# Patient Record
Sex: FEMALE | Race: WHITE | Employment: FULL TIME | URBAN - NONMETROPOLITAN AREA
[De-identification: names, ages, dates, MRNs, and addresses within clinical notes are randomized per-mention and may not be internally consistent; named-entity substitution may affect disease eponyms.]

---

## 2017-03-08 DIAGNOSIS — Z30.41 SURVEILLANCE FOR BIRTH CONTROL, ORAL CONTRACEPTIVES: ICD-10-CM

## 2017-03-08 RX ORDER — LEVONORGESTREL AND ETHINYL ESTRADIOL 0.15-0.03
1 KIT ORAL DAILY
Qty: 112 TABLET | Refills: 1 | Status: SHIPPED | OUTPATIENT
Start: 2017-03-08 | End: 2017-08-25

## 2017-03-08 NOTE — TELEPHONE ENCOUNTER
Benjamin 0.15-30 mg - mcg       Last Written Prescription Date: 04/01/2016  Last Fill Quantity: 112,  # refills: 03   Last Office Visit with [facility], [facility] or [facility] prescribing provider: 10/07/2016        Rangel Sarmiento Technician  [facility]

## 2017-04-07 ENCOUNTER — OFFICE VISIT (OUTPATIENT)
Dept: FAMILY MEDICINE | Facility: CLINIC | Age: 32
End: 2017-04-07
Payer: COMMERCIAL

## 2017-04-07 VITALS
WEIGHT: 150 LBS | TEMPERATURE: 97.4 F | HEART RATE: 78 BPM | SYSTOLIC BLOOD PRESSURE: 120 MMHG | BODY MASS INDEX: 24.96 KG/M2 | OXYGEN SATURATION: 99 % | DIASTOLIC BLOOD PRESSURE: 82 MMHG

## 2017-04-07 DIAGNOSIS — B35.4 RINGWORM OF BODY: Primary | ICD-10-CM

## 2017-04-07 DIAGNOSIS — L85.8 CUTANEOUS HORN: ICD-10-CM

## 2017-04-07 DIAGNOSIS — L71.0 PERIORAL DERMATITIS: ICD-10-CM

## 2017-04-07 PROCEDURE — 99213 OFFICE O/P EST LOW 20 MIN: CPT | Mod: 25 | Performed by: FAMILY MEDICINE

## 2017-04-07 PROCEDURE — 17110 DESTRUCTION B9 LES UP TO 14: CPT | Performed by: FAMILY MEDICINE

## 2017-04-07 RX ORDER — PRENATAL VIT 91/IRON/FOLIC/DHA 28-975-200
COMBINATION PACKAGE (EA) ORAL 2 TIMES DAILY
Qty: 15 G | Refills: 1 | Status: SHIPPED | OUTPATIENT
Start: 2017-04-07 | End: 2017-10-20

## 2017-04-07 RX ORDER — DOXYCYCLINE 100 MG/1
100 CAPSULE ORAL 2 TIMES DAILY
Qty: 60 CAPSULE | Refills: 1 | Status: SHIPPED | OUTPATIENT
Start: 2017-04-07 | End: 2017-10-20

## 2017-04-07 RX ORDER — TRIAMCINOLONE ACETONIDE 1 MG/G
CREAM TOPICAL
Qty: 80 G | Refills: 0 | Status: SHIPPED | OUTPATIENT
Start: 2017-04-07 | End: 2017-10-20

## 2017-04-07 NOTE — NURSING NOTE
"Chief Complaint   Patient presents with     Derm Problem       Initial /82 (BP Location: Right arm, Patient Position: Chair, Cuff Size: Adult Regular)  Pulse 78  Temp 97.4  F (36.3  C) (Tympanic)  Wt 150 lb (68 kg)  SpO2 99%  BMI 24.96 kg/m2 Estimated body mass index is 24.96 kg/(m^2) as calculated from the following:    Height as of 10/7/16: 5' 5\" (1.651 m).    Weight as of this encounter: 150 lb (68 kg).  Medication Reconciliation: complete  "

## 2017-04-07 NOTE — PROGRESS NOTES
SUBJECTIVE:                                                    Britney Marquez is a 31 year old female who presents to clinic today for the following health issues:    Patient has a quarter size rash on left side of her neck, has been using clomitrozole cream and it is not getting better.     Also has a Mole on her chest she would like looked at.   Last few weeks the mole has grown and hurts.     Gets periodic pimples and papules around mouth, is just healing up now. Flares up regularly.      Problem list and histories reviewed & adjusted, as indicated.  Additional history: as documented    BP Readings from Last 3 Encounters:   04/07/17 120/82   10/07/16 122/76   09/26/16 130/88    Wt Readings from Last 3 Encounters:   04/07/17 68 kg (150 lb)   10/07/16 66.2 kg (146 lb)   09/26/16 66.7 kg (147 lb)        Reviewed and updated as needed this visit by clinical staff  Tobacco  Allergies  Med Hx  Surg Hx  Fam Hx  Soc Hx      Reviewed and updated as needed this visit by Provider       OBJECTIVE:                                                    /82 (BP Location: Right arm, Patient Position: Chair, Cuff Size: Adult Regular)  Pulse 78  Temp 97.4  F (36.3  C) (Tympanic)  Wt 68 kg (150 lb)  SpO2 99%  BMI 24.96 kg/m2  Body mass index is 24.96 kg/(m^2).  General: appears well, in no distress   Skin: 2 cm round erythematous patch with rolled borders and central clearing on left neck, 3x4 mm pedunculated mole on chest, few scattered papules on chin     ASSESSMENT/PLAN:                                                      ASSESSMENT:  1. Ringworm of body    2. Perioral dermatitis    3. Cutaneous horn        PLAN:  Orders Placed This Encounter     triamcinolone (KENALOG) 0.1 % cream     terbinafine (LAMISIL AT) 1 % cream     doxycycline (VIBRAMYCIN) 100 MG capsule     Cryotherapy x 2 to lesion on chest  As below  Patient Instructions   Do both the creams twice a day, stop the steroid cream after 6 days,  continue the lamisil if needed    Doxycycline twice a day, can move down to one a day if much better       Ivory Hicks MD  [facility] [place][note 2]

## 2017-04-07 NOTE — MR AVS SNAPSHOT
After Visit Summary   4/7/2017    Britney Marquez    MRN: [id number]           Patient Information     Date Of Birth          1985        Visit Information        Provider Department      4/7/2017 1:00 PM Ivory Hicks MD [facility]        Today's Diagnoses     Ringworm of body    -  1    Perioral dermatitis        Cutaneous horn          Care Instructions    Do both the creams twice a day, stop the steroid cream after 6 days, continue the lamisil if needed    Doxycycline twice a day, can move down to one a day if much better        Follow-ups after your visit        Who to contact     If you have questions or need follow up information about today's clinic visit or your schedule please contact [facility] [place] directly at [phone].  Normal or non-critical lab and imaging results will be communicated to you by MyChart, letter or phone within 4 business days after the clinic has received the results. If you do not hear from us within 7 days, please contact the clinic through MyChart or phone. If you have a critical or abnormal lab result, we will notify you by phone as soon as possible.  Submit refill requests through [employer] or call your pharmacy and they will forward the refill request to us. Please allow 3 business days for your refill to be completed.          Additional Information About Your Visit        MyChart Information     [employer] gives you secure access to your electronic health record. If you see a primary care provider, you can also send messages to your care team and make appointments. If you have questions, please call your primary care clinic.  If you do not have a primary care provider, please call [phone] and they will assist you.        Care EveryWhere ID     This is your Care EveryWhere ID. This could be used by other organizations to access your [place] medical records  [id number]        Your Vitals Were     Pulse Temperature  Pulse Oximetry BMI (Body Mass Index)          78 97.4  F (36.3  C) (Tympanic) 99% 24.96 kg/m2         Blood Pressure from Last 3 Encounters:   04/07/17 120/82   10/07/16 122/76   09/26/16 130/88    Weight from Last 3 Encounters:   04/07/17 150 lb (68 kg)   10/07/16 146 lb (66.2 kg)   09/26/16 147 lb (66.7 kg)              Today, you had the following     No orders found for display         Today's Medication Changes          These changes are accurate as of: 4/7/17  1:57 PM.  If you have any questions, ask your nurse or doctor.               Start taking these medicines.        Dose/Directions    doxycycline 100 MG capsule   Commonly known as:  VIBRAMYCIN   Used for:  Perioral dermatitis, Cutaneous horn   Started by:  Ivory Hicks MD        Dose:  100 mg   Take 1 capsule (100 mg) by mouth 2 times daily   Quantity:  60 capsule   Refills:  1       terbinafine 1 % cream   Commonly known as:  lamISIL AT   Used for:  Ringworm of body   Started by:  Ivory Hicks MD        Apply topically 2 times daily For fungal infection not resolved with other antifungals (e.g. Clotrimazole)   Quantity:  15 g   Refills:  1       triamcinolone 0.1 % cream   Commonly known as:  KENALOG   Used for:  Ringworm of body   Started by:  Ivory Hicks MD        Apply sparingly to affected area two times daily as needed   Quantity:  80 g   Refills:  0            Where to get your medicines      These medications were sent to [address] [address]     Phone:  [phone]     doxycycline 100 MG capsule    terbinafine 1 % cream    triamcinolone 0.1 % cream                Primary Care Provider Office Phone # Fax #    Ivory Hicks [phone]       [address] [facility] [address]        Thank you!     Thank you for choosing [facility]  for your care. Our goal is always to provide you with  excellent care. Hearing back from our patients is one way we can continue to improve our services. Please take a few minutes to complete the written survey that you may receive in the mail after your visit with us. Thank you!             Your Updated Medication List - Protect others around you: Learn how to safely use, store and throw away your medicines at www.disposemymeds.org.          This list is accurate as of: 4/7/17  1:57 PM.  Always use your most recent med list.                   Brand Name Dispense Instructions for use    azelastine 0.05 % Soln ophthalmic solution    OPTIVAR    1 Bottle    Apply 1 drop to eye 2 times daily       doxycycline 100 MG capsule    VIBRAMYCIN    60 capsule    Take 1 capsule (100 mg) by mouth 2 times daily       levocetirizine 5 MG tablet    XYZAL    30 tablet    Take 1 tablet (5 mg) by mouth every evening       levonorgestrel-ethinyl estradiol 0.15-30 MG-MCG per tablet    NORDETTE    112 tablet    Take 1 tablet by mouth daily Take active pills for 12 weeks then placebo x 1 week and restart.       terbinafine 1 % cream    lamISIL AT    15 g    Apply topically 2 times daily For fungal infection not resolved with other antifungals (e.g. Clotrimazole)       triamcinolone 0.1 % cream    KENALOG    80 g    Apply sparingly to affected area two times daily as needed

## 2017-04-07 NOTE — PATIENT INSTRUCTIONS
Do both the creams twice a day, stop the steroid cream after 6 days, continue the lamisil if needed    Doxycycline twice a day, can move down to one a day if much better

## 2017-06-02 ENCOUNTER — OFFICE VISIT (OUTPATIENT)
Dept: FAMILY MEDICINE | Facility: CLINIC | Age: 32
End: 2017-06-02
Payer: COMMERCIAL

## 2017-06-02 VITALS
HEIGHT: 65 IN | DIASTOLIC BLOOD PRESSURE: 80 MMHG | HEART RATE: 83 BPM | BODY MASS INDEX: 24.99 KG/M2 | OXYGEN SATURATION: 96 % | SYSTOLIC BLOOD PRESSURE: 129 MMHG | WEIGHT: 150 LBS

## 2017-06-02 DIAGNOSIS — Z30.8 ENCOUNTER FOR OTHER CONTRACEPTIVE MANAGEMENT: ICD-10-CM

## 2017-06-02 DIAGNOSIS — L65.9 ALOPECIA: ICD-10-CM

## 2017-06-02 DIAGNOSIS — M79.2 NEURALGIA INVOLVING SCALP: ICD-10-CM

## 2017-06-02 DIAGNOSIS — R51.9 SCALP PAIN: Primary | ICD-10-CM

## 2017-06-02 PROCEDURE — 99213 OFFICE O/P EST LOW 20 MIN: CPT | Performed by: FAMILY MEDICINE

## 2017-06-02 RX ORDER — GABAPENTIN 300 MG/1
CAPSULE ORAL
Qty: 20 CAPSULE | Refills: 1 | Status: SHIPPED | OUTPATIENT
Start: 2017-06-02 | End: 2017-10-20

## 2017-06-02 NOTE — MR AVS SNAPSHOT
After Visit Summary   6/2/2017    Britney Marquez    MRN: [id number]           Patient Information     Date Of Birth          1985        Visit Information        Provider Department      6/2/2017 1:20 PM Ivory Hicks MD [facility]        Today's Diagnoses     Scalp pain    -  1    Alopecia        Neuralgia involving scalp        Encounter for other contraceptive management          Care Instructions    Try taking Gabapentin at bedtime to see if this helps. If it does, it tells us that this is nerve pain. Start with just 1 pill, and you can increase up to 2 if you need.    Make an appointment with dermatology to see if they have any additional insight.             Follow-ups after your visit        Additional Services     DERMATOLOGY REFERRAL       Your provider has referred you to: [facility]: [facility] [phone]   [email or web address]    Please be aware that coverage of these services is subject to the terms and limitations of your health insurance plan.  Call member services at your health plan with any benefit or coverage questions.      Please bring the following with you to your appointment:    (1) Any X-Rays, CTs or MRIs which have been performed.  Contact the facility where they were done to arrange for  prior to your scheduled appointment.    (2) List of current medications  (3) This referral request   (4) Any documents/labs given to you for this referral            OB/GYN REFERRAL       Your provider has referred you to:  [facility]: [facility] [phone]   [email or web address]    Please be aware that coverage of these services is subject to the terms and limitations of your health insurance plan.  Call member services at your health plan with any benefit or coverage questions.      Please bring the following with you to your appointment:    (1) Any X-Rays, CTs or MRIs  "which have been performed.  Contact the facility where they were done to arrange for  prior to your scheduled appointment.   (2) List of current medications   (3) This referral request   (4) Any documents/labs given to you for this referral                  Who to contact     If you have questions or need follow up information about today's clinic visit or your schedule please contact [facility] directly at [phone].  Normal or non-critical lab and imaging results will be communicated to you by Open[employer]ance[employer], letter or phone within 4 business days after the clinic has received the results. If you do not hear from us within 7 days, please contact the clinic through [employer] or phone. If you have a critical or abnormal lab result, we will notify you by phone as soon as possible.  Submit refill requests through [employer] or call your pharmacy and they will forward the refill request to us. Please allow 3 business days for your refill to be completed.          Additional Information About Your Visit        Open[employer]ance[employer]Gig Information     [employer] gives you secure access to your electronic health record. If you see a primary care provider, you can also send messages to your care team and make appointments. If you have questions, please call your primary care clinic.  If you do not have a primary care provider, please call [phone] and they will assist you.        Care EveryWhere ID     This is your Care EveryWhere ID. This could be used by other organizations to access your [place] medical records  [id number]        Your Vitals Were     Pulse Height Pulse Oximetry BMI (Body Mass Index)          83 5' 5\" (1.651 m) 96% 24.96 kg/m2         Blood Pressure from Last 3 Encounters:   06/02/17 129/80   04/07/17 120/82   10/07/16 122/76    Weight from Last 3 Encounters:   06/02/17 150 lb (68 kg)   04/07/17 150 lb (68 kg)   10/07/16 146 lb (66.2 kg)              We Performed the Following     DERMATOLOGY REFERRAL  "    OB/GYN REFERRAL          Today's Medication Changes          These changes are accurate as of: 6/2/17  1:50 PM.  If you have any questions, ask your nurse or doctor.               Start taking these medicines.        Dose/Directions    gabapentin 300 MG capsule   Commonly known as:  NEURONTIN   Used for:  Scalp pain, Neuralgia involving scalp   Started by:  Ivory Hicks MD        Take 1 tablet (300 mg) at bedtime PRN   Quantity:  20 capsule   Refills:  1            Where to get your medicines      These medications were sent to [address] [address]     Phone:  [phone]     gabapentin 300 MG capsule                Primary Care Provider Office Phone # Fax #    Ivory Hicks [phone]       [address] [facility] [address]        Thank you!     Thank you for choosing [facility] [place]  for your care. Our goal is always to provide you with excellent care. Hearing back from our patients is one way we can continue to improve our services. Please take a few minutes to complete the written survey that you may receive in the mail after your visit with us. Thank you!             Your Updated Medication List - Protect others around you: Learn how to safely use, store and throw away your medicines at www.disposemymeds.org.          This list is accurate as of: 6/2/17  1:50 PM.  Always use your most recent med list.                   Brand Name Dispense Instructions for use    azelastine 0.05 % Soln ophthalmic solution    OPTIVAR    1 Bottle    Apply 1 drop to eye 2 times daily       doxycycline 100 MG capsule    VIBRAMYCIN    60 capsule    Take 1 capsule (100 mg) by mouth 2 times daily       gabapentin 300 MG capsule    NEURONTIN    20 capsule    Take 1 tablet (300 mg) at bedtime PRN       levocetirizine 5 MG tablet    XYZAL    30 tablet    Take 1 tablet (5 mg) by mouth every  evening       levonorgestrel-ethinyl estradiol 0.15-30 MG-MCG per tablet    KAY    112 tablet    Take 1 tablet by mouth daily Take active pills for 12 weeks then placebo x 1 week and restart.       terbinafine 1 % cream    lamISIL AT    15 g    Apply topically 2 times daily For fungal infection not resolved with other antifungals (e.g. Clotrimazole)       triamcinolone 0.1 % cream    KENALOG    80 g    Apply sparingly to affected area two times daily as needed

## 2017-06-02 NOTE — PROGRESS NOTES
"  SUBJECTIVE:                                                    Britney Marquez is a 31 year old female who presents to clinic today for the following health issues:    Rt scalp issues - pain and losing hair  TSH   Date Value Ref Range Status   07/16/2015 2.67 0.40 - 4.00 mU/L Final   01/24/2012 2.59 0.4 - 5.0 mU/L Final     She has an area on her scalp that is very painful. She gets headaches simply from the weight of her hair. She has to blow dry it to relive some of the pressure. She cannot put her hair in a pony because it hurts. She states that it feels like she \"is always burning.\"  It seems to be 1 specific area on the right, parietal area. She has noticed that her hair is falling out in this location as well.   She has tried different hair products to see if it was a skin reaction, but nothing seems to help.   Sometimes pain is so severe she can't stand it. Has been months    Birth control-  She would like a referral to OB/GYN for either Essure or tubal ligation.    The doxycycline really cleared up her skin, which she is very pleased about.    The cutaneous horn resolved with the cryotherapy and didn't scar.     The ringworm cleared up completely.    Problem list and histories reviewed & adjusted, as indicated.  Additional history: as documented    BP Readings from Last 3 Encounters:   06/02/17 129/80   04/07/17 120/82   10/07/16 122/76    Wt Readings from Last 3 Encounters:   06/02/17 68 kg (150 lb)   04/07/17 68 kg (150 lb)   10/07/16 66.2 kg (146 lb)         Reviewed and updated as needed this visit by clinical staff  Tobacco  Allergies  Meds  Problems  Med Hx  Surg Hx  Fam Hx  Soc Hx        Reviewed and updated as needed this visit by Provider  Allergies  Meds  Problems       ROS:  CONSTITUTIONAL:NEGATIVE for fever, chills, change in weight  INTEGUMENTARY/SKIN: POSITIVE for hair loss  GI: NEGATIVE for nausea, abdominal pain, heartburn, or change in bowel habits  NEURO: POSITIVE for " "burning sensation on scalp    OBJECTIVE:                                                    /80  Pulse 83  Ht 1.651 m (5' 5\")  Wt 68 kg (150 lb)  SpO2 96%  BMI 24.96 kg/m2  Body mass index is 24.96 kg/(m^2).  GENERAL: healthy, alert and no distress  SKIN:10 cm tender area on right posterior parietal area. No skin changes noted. Slight hair thinning in this area.        ASSESSMENT/PLAN:                                                    Britney was seen today for hair/scalp problem.    Diagnoses and all orders for this visit:    Scalp pain  -     DERMATOLOGY REFERRAL  -     gabapentin (NEURONTIN) 300 MG capsule; Take 1 tablet (300 mg) at bedtime PRN    Alopecia  -     DERMATOLOGY REFERRAL    Neuralgia involving scalp  -     gabapentin (NEURONTIN) 300 MG capsule; Take 1 tablet (300 mg) at bedtime PRN    Encounter for other contraceptive management  -     OB/GYN REFERRAL        Patient Instructions   Try taking Gabapentin at bedtime to see if this helps. If it does, it tells us that this is nerve pain. Start with just 1 pill, and you can increase up to 2 if you need.    Make an appointment with dermatology to see if they have any additional insight.       This document serves as a record of the services and decisions personally performed and made by Ivory Hicks MD. It was created on her behalf by Tamica Jolly, a trained medical scribe. The creation of this document is based the provider's statements to the medical scribe.  Tamica Jolly 2:04 PM 6/2/2017    Provider:   The information in this document, created by the medical scribe for me, accurately reflects the services I personally performed and the decisions made by me. I have reviewed and approved this document for accuracy prior to leaving the patient care area.  Ivory Hicks MD 2:04 PM 6/2/2017      Ivory Hicks MD  [facility] [place] [facility] [place]  "

## 2017-06-02 NOTE — NURSING NOTE
"Chief Complaint   Patient presents with     Hair/Scalp Problem     rt scalp poin and loosing hair       Initial /80  Pulse 83  Ht 5' 5\" (1.651 m)  Wt 150 lb (68 kg)  SpO2 96%  BMI 24.96 kg/m2 Estimated body mass index is 24.96 kg/(m^2) as calculated from the following:    Height as of this encounter: 5' 5\" (1.651 m).    Weight as of this encounter: 150 lb (68 kg).  Medication Reconciliation: complete      "

## 2017-06-02 NOTE — PATIENT INSTRUCTIONS
Try taking Gabapentin at bedtime to see if this helps. If it does, it tells us that this is nerve pain. Start with just 1 pill, and you can increase up to 2 if you need.    Make an appointment with dermatology to see if they have any additional insight.

## 2017-08-25 ENCOUNTER — TELEPHONE (OUTPATIENT)
Dept: FAMILY MEDICINE | Facility: CLINIC | Age: 32
End: 2017-08-25

## 2017-08-25 DIAGNOSIS — J30.9 ALLERGIC RHINITIS: ICD-10-CM

## 2017-08-25 DIAGNOSIS — Z30.41 SURVEILLANCE FOR BIRTH CONTROL, ORAL CONTRACEPTIVES: ICD-10-CM

## 2017-08-25 RX ORDER — LEVOCETIRIZINE DIHYDROCHLORIDE 5 MG/1
TABLET, FILM COATED ORAL
Qty: 30 TABLET | Refills: 1 | Status: SHIPPED | OUTPATIENT
Start: 2017-08-25 | End: 2018-07-20

## 2017-08-25 RX ORDER — LEVONORGESTREL AND ETHINYL ESTRADIOL 0.15-0.03
KIT ORAL
Qty: 112 TABLET | Refills: 1 | Status: SHIPPED | OUTPATIENT
Start: 2017-08-25 | End: 2018-02-12

## 2017-10-19 ENCOUNTER — TELEPHONE (OUTPATIENT)
Dept: FAMILY MEDICINE | Facility: CLINIC | Age: 32
End: 2017-10-19

## 2017-10-19 NOTE — TELEPHONE ENCOUNTER
Patient called and she is reporting a low grade fever not greater than 99 and reports that daughter is now sick and has fever on 100. Patient reports bilateral ear pain and congestion with continual runny nose with light colored secretions. Patient denies bladder pain, no breathing difficulties. Patient feels congested and has sinus pain and headache. Reports did not have the flu shot and states is having muscle aches and pain. Currently is taking OTC mucinex with tylenol added in ingredients. Reports is drinking well. Advised to be seen, have scheduled an appointment for tomorrow morning at 11. Advised to use OTC decongestant, encourage extra fluids,  and may use tylenol or ibuprofen for the pain. Advised if fever worsens and is not remedied with pain relievers to seek the ER sooner. Patient agrees with the plan. Have also set patient's daughter up to be seen tomorrow as well.  FERNANDA Watts

## 2017-10-19 NOTE — TELEPHONE ENCOUNTER
Reason for call:  Patient reporting a symptom    Symptom or request: low grade fever last two weeks    Duration (how long have symptoms been present): 2 weeks    Have you been treated for this before? No        Phone Number patient can be reached at:  Home number on file [phone] (home)    Best Time:  anytime    Can we leave a detailed message on this number:  Not Applicable    Call taken on 10/19/2017 at 1:15 PM by Rhianna Beaver

## 2017-10-20 ENCOUNTER — OFFICE VISIT (OUTPATIENT)
Dept: FAMILY MEDICINE | Facility: CLINIC | Age: 32
End: 2017-10-20
Payer: COMMERCIAL

## 2017-10-20 VITALS
OXYGEN SATURATION: 98 % | HEART RATE: 72 BPM | DIASTOLIC BLOOD PRESSURE: 80 MMHG | SYSTOLIC BLOOD PRESSURE: 128 MMHG | TEMPERATURE: 97.2 F | BODY MASS INDEX: 24.96 KG/M2 | WEIGHT: 150 LBS

## 2017-10-20 DIAGNOSIS — R05.9 COUGH: ICD-10-CM

## 2017-10-20 DIAGNOSIS — J01.90 ACUTE SINUSITIS WITH SYMPTOMS > 10 DAYS: Primary | ICD-10-CM

## 2017-10-20 PROCEDURE — 99213 OFFICE O/P EST LOW 20 MIN: CPT | Performed by: FAMILY MEDICINE

## 2017-10-20 NOTE — MR AVS SNAPSHOT
After Visit Summary   10/20/2017    Britney Marquez    MRN: [id number]           Patient Information     Date Of Birth          1985        Visit Information        Provider Department      10/20/2017 11:00 AM Ivory Hicks MD [facility]        Today's Diagnoses     Acute sinusitis with symptoms > 10 days    -  1    Cough          Care Instructions    Antibiotic   Rest  Fluids  Quit smoking  Return to clinic if symptoms persist or worsen.           Follow-ups after your visit        Who to contact     If you have questions or need follow up information about today's clinic visit or your schedule please contact [facility] directly at [phone].  Normal or non-critical lab and imaging results will be communicated to you by MyChart, letter or phone within 4 business days after the clinic has received the results. If you do not hear from us within 7 days, please contact the clinic through [employer] or phone. If you have a critical or abnormal lab result, we will notify you by phone as soon as possible.  Submit refill requests through [facility] or call your pharmacy and they will forward the refill request to us. Please allow 3 business days for your refill to be completed.          Additional Information About Your Visit        MyChart Information     [facility] gives you secure access to your electronic health record. If you see a primary care provider, you can also send messages to your care team and make appointments. If you have questions, please call your primary care clinic.  If you do not have a primary care provider, please call [phone] and they will assist you.        Care EveryWhere ID     This is your Care EveryWhere ID. This could be used by other organizations to access your [place] medical records  [id number]        Your Vitals Were     Pulse Temperature Pulse Oximetry BMI (Body Mass Index)          72 97.2  F (36.2  C) (Tympanic) 98% 24.96  kg/m2         Blood Pressure from Last 3 Encounters:   10/20/17 128/80   06/02/17 129/80   04/07/17 120/82    Weight from Last 3 Encounters:   10/20/17 150 lb (68 kg)   06/02/17 150 lb (68 kg)   04/07/17 150 lb (68 kg)              Today, you had the following     No orders found for display         Today's Medication Changes          These changes are accurate as of: 10/20/17 11:06 AM.  If you have any questions, ask your nurse or doctor.               Start taking these medicines.        Dose/Directions    amoxicillin-clavulanate 875-125 MG per tablet   Commonly known as:  AUGMENTIN   Used for:  Acute sinusitis with symptoms > 10 days, Cough   Started by:  Ivory Hicks MD        Dose:  1 tablet   Take 1 tablet by mouth 2 times daily   Quantity:  20 tablet   Refills:  0         Stop taking these medicines if you haven't already. Please contact your care team if you have questions.     doxycycline 100 MG capsule   Commonly known as:  VIBRAMYCIN   Stopped by:  Ivory Hicks MD           gabapentin 300 MG capsule   Commonly known as:  NEURONTIN   Stopped by:  Ivory Hicks MD           terbinafine 1 % cream   Commonly known as:  lamISIL AT   Stopped by:  Ivory Hicks MD           triamcinolone 0.1 % cream   Commonly known as:  KENALOG   Stopped by:  Ivory Hicks MD                Where to get your medicines      These medications were sent to [facility] [address] [address]     Phone:  [phone]     amoxicillin-clavulanate 875-125 MG per tablet                Primary Care Provider Office Phone # Fax #    Ivory Hicks [phone]       [address] [place] [address]        Equal Access to Services     RHODA GUTIÉRREZ : Amisha Riley, jolie barrett, jesse james, robin traylor. So [facility] [phone].    ATENCIÓN: tiffanie Montelongo  castro disposición servicios gratuitos de asistencia lingüística. Cornelius joseph [phone].    We comply with applicable federal civil rights laws and [place] laws. We do not discriminate on the basis of race, color, national origin, age, disability, sex, sexual orientation, or gender identity.            Thank you!     Thank you for choosing [facility]  for your care. Our goal is always to provide you with excellent care. Hearing back from our patients is one way we can continue to improve our services. Please take a few minutes to complete the written survey that you may receive in the mail after your visit with us. Thank you!             Your Updated Medication List - Protect others around you: Learn how to safely use, store and throw away your medicines at www.disposemymeds.org.          This list is accurate as of: 10/20/17 11:06 AM.  Always use your most recent med list.                   Brand Name Dispense Instructions for use Diagnosis    amoxicillin-clavulanate 875-125 MG per tablet    AUGMENTIN    20 tablet    Take 1 tablet by mouth 2 times daily    Acute sinusitis with symptoms > 10 days, Cough       azelastine 0.05 % Soln ophthalmic solution    OPTIVAR    1 Bottle    Apply 1 drop to eye 2 times daily    Chronic allergic conjunctivitis       levocetirizine 5 MG tablet    XYZAL    30 tablet    TAKE ONE TABLET BY MOUTH EVERY EVENING    Allergic rhinitis       BEATRIZ-28 0.15-30 MG-MCG per tablet   Generic drug:  levonorgestrel-ethinyl estradiol     112 tablet    TAKE 1 TABLET BY MOUTH EVERY DAY. TAKE  ACTIVE PILLS BY MOUTH  FOR 12 WEEKS (3 MONTHS), THEN PLACEBO TABLET FOR 1 WEEK, THEN RESTART    Surveillance for birth control, oral contraceptives

## 2017-10-20 NOTE — PROGRESS NOTES
SUBJECTIVE:   Britney Marquez is a 32 year old female who presents to clinic today for the following health issues:      RESPIRATORY SYMPTOMS      Duration: 2 weeks    Description  nasal congestion, rhinorrhea, facial pain/pressure, cough, fever, chills, ear pain both, headache, fatigue/malaise and hoarse voice    Severity: severe    Accompanying signs and symptoms: None    History (predisposing factors):  none    Precipitating or alleviating factors: None    Therapies tried and outcome:  rest and mucinex    Started with fever, cough, runny nose, pain and pressure in sinuses, ears. Not getting better.   Still smoking 5 cig/day. Malaise.     Problem list and histories reviewed & adjusted, as indicated.  Additional history: as documented    BP Readings from Last 3 Encounters:   10/20/17 128/80   06/02/17 129/80   04/07/17 120/82    Wt Readings from Last 3 Encounters:   10/20/17 150 lb (68 kg)   06/02/17 150 lb (68 kg)   04/07/17 150 lb (68 kg)           Reviewed and updated as needed this visit by clinical staffTobacco  Allergies  Med Hx  Surg Hx  Fam Hx  Soc Hx      Reviewed and updated as needed this visit by Provider         OBJECTIVE: /80 (BP Location: Left arm)  Pulse 72  Temp 97.2  F (36.2  C) (Tympanic)  Wt 150 lb (68 kg)  SpO2 98%  BMI 24.96 kg/m2   General: appears well, no distress  HEENT: TMs both with fluid, no erythema, and canals negative bilaterally, oropharynx with erythema, no exudate  Neck: supple, small anterior cervical  adenopathy  Heart: regular rate and rhythm, normal S1S2, no murmur  Lungs: clear to ascultation     ASSESSMENT:  1. Acute sinusitis with symptoms > 10 days    2. Cough        PLAN:  Orders Placed This Encounter     amoxicillin-clavulanate (AUGMENTIN) 875-125 MG per tablet       Patient Instructions   Antibiotic   Rest  Fluids  Quit smoking  Return to clinic if symptoms persist or worsen.      Ivory Hayward MD

## 2017-10-20 NOTE — NURSING NOTE
"Chief Complaint   Patient presents with     Sinus Problem     x 2 month       Initial /80 (BP Location: Left arm)  Pulse 72  Temp 97.2  F (36.2  C) (Tympanic)  Wt 150 lb (68 kg)  SpO2 98%  BMI 24.96 kg/m2 Estimated body mass index is 24.96 kg/(m^2) as calculated from the following:    Height as of 6/2/17: 5' 5\" (1.651 m).    Weight as of this encounter: 150 lb (68 kg).  Medication Reconciliation: complete    Health Maintenance that is potentially due pending provider review:  NONE    n/a    Is there anyone who you would like to be able to receive your results? No  If yes have patient fill out PENNY    "

## 2017-10-25 ENCOUNTER — TELEPHONE (OUTPATIENT)
Dept: FAMILY MEDICINE | Facility: CLINIC | Age: 32
End: 2017-10-25

## 2017-10-25 NOTE — TELEPHONE ENCOUNTER
Spoke with Britney, some one at her work told her to call to see if she could get a steroid for her sinus infection because she is not feeling any better on the Augmentin, she denies fever, wheezing or any increased work of breathing. Advised she should continue the Augmentin, if not feeling better after full treatment or if she gets worse, should call or come in.

## 2017-10-25 NOTE — TELEPHONE ENCOUNTER
Reason for call:  Patient reporting a symptom    Symptom or request: Britney says she was seen last Friday by Dr Hicks for sinus infection and was put on Augmentin. She has taken the antibiotic for 5 days not and is still very congested and has a lot of sinus pressure. She is asking if she can talk to the nurse.    Phone Number patient can be reached at:  Home number on file [phone] (home)    Best Time:  anytime    Can we leave a detailed message on this number:  YES    Call taken on 10/25/2017 at 2:55 PM by Sandra Mike

## 2017-10-31 ENCOUNTER — TELEPHONE (OUTPATIENT)
Dept: FAMILY MEDICINE | Facility: CLINIC | Age: 32
End: 2017-10-31

## 2017-10-31 DIAGNOSIS — J01.90 ACUTE SINUSITIS WITH SYMPTOMS GREATER THAN 10 DAYS: Primary | ICD-10-CM

## 2017-10-31 RX ORDER — PREDNISONE 20 MG/1
20 TABLET ORAL 2 TIMES DAILY
Qty: 10 TABLET | Refills: 0 | Status: SHIPPED | OUTPATIENT
Start: 2017-10-31 | End: 2017-12-05

## 2017-10-31 NOTE — TELEPHONE ENCOUNTER
Spoke with pt who continues with sinus congestion and ear pain.  Afebrile.  Denies other symptoms cough, SOB, nasal drainage.      Pt states that on 10/25/17 call she was told to stop taking Augmentin if not helping her.  Note states:  Sejal Esparza RN     10/25/17 3:11 PM     Spoke with Britney, some one at her work told her to call to see if she could get a steroid for her sinus infection because she is not feeling any better on the Augmentin, she denies fever, wheezing or any increased work of breathing. Advised she should continue the Augmentin, if not feeling better after full treatment or if she gets worse, should call or come in.         Pt has been off of Augmentin since 10/25 after completing 5 of 10 prescribed days.  Please advise on how you would like pt to proceed.  Finish previous course of abx or provide new prescription?    Jillian TORO RN

## 2017-10-31 NOTE — TELEPHONE ENCOUNTER
Reason for call:  Patient reporting a symptom    Symptom or request: Ear pain, sore throat and sinus continue to be a issue. Pleas see 10/20/17 OV & Tele Visit    Duration (how long have symptoms been present): 4th week    Have you been treated for this before? Yes    Phone Number patient can be reached at:  Home number on file [phone] (home)    Best Time:  Any Time      Can we leave a detailed message on this number:  YES    Call taken on 10/31/2017 at 2:44 PM by Carmen Arnett

## 2017-12-05 ENCOUNTER — HOSPITAL ENCOUNTER (OUTPATIENT)
Dept: CT IMAGING | Facility: CLINIC | Age: 32
Discharge: HOME | End: 2017-12-05
Attending: FAMILY MEDICINE | Admitting: FAMILY MEDICINE
Payer: COMMERCIAL

## 2017-12-05 ENCOUNTER — OFFICE VISIT (OUTPATIENT)
Dept: FAMILY MEDICINE | Facility: CLINIC | Age: 32
End: 2017-12-05
Payer: COMMERCIAL

## 2017-12-05 VITALS
OXYGEN SATURATION: 98 % | HEART RATE: 96 BPM | WEIGHT: 152 LBS | BODY MASS INDEX: 25.29 KG/M2 | TEMPERATURE: 97.9 F | DIASTOLIC BLOOD PRESSURE: 84 MMHG | SYSTOLIC BLOOD PRESSURE: 124 MMHG

## 2017-12-05 DIAGNOSIS — L71.0 PERIORAL DERMATITIS: ICD-10-CM

## 2017-12-05 DIAGNOSIS — R10.84 ABDOMINAL PAIN, GENERALIZED: ICD-10-CM

## 2017-12-05 DIAGNOSIS — R10.84 ABDOMINAL PAIN, GENERALIZED: Primary | ICD-10-CM

## 2017-12-05 LAB
ALBUMIN SERPL-MCNC: 4 G/DL (ref 3.4–5)
ALBUMIN UR-MCNC: NEGATIVE MG/DL
ALP SERPL-CCNC: 64 U/L (ref 40–150)
ALT SERPL W P-5'-P-CCNC: 36 U/L (ref 0–50)
ANION GAP SERPL CALCULATED.3IONS-SCNC: 9 MMOL/L (ref 3–14)
APPEARANCE UR: CLEAR
AST SERPL W P-5'-P-CCNC: 23 U/L (ref 0–45)
BASOPHILS # BLD AUTO: 0.1 10E9/L (ref 0–0.2)
BASOPHILS NFR BLD AUTO: 0.6 %
BILIRUB SERPL-MCNC: 0.4 MG/DL (ref 0.2–1.3)
BILIRUB UR QL STRIP: NEGATIVE
BUN SERPL-MCNC: 8 MG/DL (ref 7–30)
CALCIUM SERPL-MCNC: 8.8 MG/DL (ref 8.5–10.1)
CHLORIDE SERPL-SCNC: 104 MMOL/L (ref 94–109)
CO2 SERPL-SCNC: 25 MMOL/L (ref 20–32)
COLOR UR AUTO: YELLOW
CREAT SERPL-MCNC: 0.63 MG/DL (ref 0.52–1.04)
CRP SERPL-MCNC: 6.7 MG/L (ref 0–8)
DIFFERENTIAL METHOD BLD: NORMAL
EOSINOPHIL # BLD AUTO: 0.4 10E9/L (ref 0–0.7)
EOSINOPHIL NFR BLD AUTO: 4.7 %
ERYTHROCYTE [DISTWIDTH] IN BLOOD BY AUTOMATED COUNT: 12.7 % (ref 10–15)
ERYTHROCYTE [SEDIMENTATION RATE] IN BLOOD BY WESTERGREN METHOD: 9 MM/H (ref 0–20)
GFR SERPL CREATININE-BSD FRML MDRD: >90 ML/MIN/1.7M2
GLUCOSE SERPL-MCNC: 96 MG/DL (ref 70–99)
GLUCOSE UR STRIP-MCNC: NEGATIVE MG/DL
HCT VFR BLD AUTO: 38.8 % (ref 35–47)
HGB BLD-MCNC: 12.5 G/DL (ref 11.7–15.7)
HGB UR QL STRIP: ABNORMAL
KETONES UR STRIP-MCNC: NEGATIVE MG/DL
LEUKOCYTE ESTERASE UR QL STRIP: NEGATIVE
LYMPHOCYTES # BLD AUTO: 2.3 10E9/L (ref 0.8–5.3)
LYMPHOCYTES NFR BLD AUTO: 27.3 %
MCH RBC QN AUTO: 30.9 PG (ref 26.5–33)
MCHC RBC AUTO-ENTMCNC: 32.2 G/DL (ref 31.5–36.5)
MCV RBC AUTO: 96 FL (ref 78–100)
MONOCYTES # BLD AUTO: 0.9 10E9/L (ref 0–1.3)
MONOCYTES NFR BLD AUTO: 10.6 %
MUCOUS THREADS #/AREA URNS LPF: PRESENT /LPF
NEUTROPHILS # BLD AUTO: 4.8 10E9/L (ref 1.6–8.3)
NEUTROPHILS NFR BLD AUTO: 56.8 %
NITRATE UR QL: NEGATIVE
NON-SQ EPI CELLS #/AREA URNS LPF: ABNORMAL /LPF
PH UR STRIP: 6.5 PH (ref 5–7)
PLATELET # BLD AUTO: 329 10E9/L (ref 150–450)
POTASSIUM SERPL-SCNC: 4.2 MMOL/L (ref 3.4–5.3)
PROT SERPL-MCNC: 7.7 G/DL (ref 6.8–8.8)
RBC # BLD AUTO: 4.04 10E12/L (ref 3.8–5.2)
RBC #/AREA URNS AUTO: ABNORMAL /HPF
SODIUM SERPL-SCNC: 138 MMOL/L (ref 133–144)
SOURCE: ABNORMAL
SP GR UR STRIP: 1.02 (ref 1–1.03)
UROBILINOGEN UR STRIP-ACNC: 0.2 EU/DL (ref 0.2–1)
WBC # BLD AUTO: 8.5 10E9/L (ref 4–11)
WBC #/AREA URNS AUTO: ABNORMAL /HPF

## 2017-12-05 PROCEDURE — 85025 COMPLETE CBC W/AUTO DIFF WBC: CPT | Performed by: FAMILY MEDICINE

## 2017-12-05 PROCEDURE — 85652 RBC SED RATE AUTOMATED: CPT | Performed by: FAMILY MEDICINE

## 2017-12-05 PROCEDURE — 25000125 ZZHC RX 250: Performed by: RADIOLOGY

## 2017-12-05 PROCEDURE — 25000128 H RX IP 250 OP 636: Performed by: RADIOLOGY

## 2017-12-05 PROCEDURE — 86140 C-REACTIVE PROTEIN: CPT | Performed by: FAMILY MEDICINE

## 2017-12-05 PROCEDURE — 74176 CT ABD & PELVIS W/O CONTRAST: CPT

## 2017-12-05 PROCEDURE — 36415 COLL VENOUS BLD VENIPUNCTURE: CPT | Performed by: FAMILY MEDICINE

## 2017-12-05 PROCEDURE — 81001 URINALYSIS AUTO W/SCOPE: CPT | Performed by: FAMILY MEDICINE

## 2017-12-05 PROCEDURE — 80053 COMPREHEN METABOLIC PANEL: CPT | Performed by: FAMILY MEDICINE

## 2017-12-05 PROCEDURE — 99214 OFFICE O/P EST MOD 30 MIN: CPT | Performed by: FAMILY MEDICINE

## 2017-12-05 RX ORDER — DOXYCYCLINE 100 MG/1
100 CAPSULE ORAL 2 TIMES DAILY
Qty: 60 CAPSULE | Refills: 1 | COMMUNITY
Start: 2017-12-05 | End: 2020-03-24

## 2017-12-05 RX ORDER — IOPAMIDOL 755 MG/ML
74 INJECTION, SOLUTION INTRAVASCULAR ONCE
Status: DISCONTINUED | OUTPATIENT
Start: 2017-12-05 | End: 2017-12-06 | Stop reason: HOSPADM

## 2017-12-05 NOTE — PROGRESS NOTES
"  SUBJECTIVE:   Britney Marquez is a 32 year old female who presents to clinic today for the following health issues:      Abdominal Pain      Duration: 2 weeks    Description (location/character/radiation): abdominal pain and fullness       Associated flank pain: None    Intensity:  severe    Accompanying signs and symptoms:        Fever/Chills: no        Gas/Bloating: YES       Nausea/vomitting: no        Diarrhea: YES       Dysuria or Hematuria: no     History (previous similar pain/trauma/previous testing): no    Precipitating or alleviating factors:       Pain worse with eating/BM/urination: yes       Pain relieved by BM: no     Therapies tried and outcome: None    LMP:  not applicable    2.5-3 weeks has felt like her belly is distended, feels a pressure downward, like \"something is coming out of me\". Like when she was in labor. Is very painful.   Has diarrhea, once a day,  has been taking imodium or over the counter med that she doesn't know the name. Is afraid to be at work even.   Is so much pressure. Always feels the need to go to the bathroom, both ends.   No dysuria, urinary frequency or urgency.   She had some vaginal bleeding, last few days. But is random, is on continuous OCPS with a period every 3 months.   Nausea in the car only. No vomiting.   No fever or chills. Does sweat a lot. The whole thing is very upsetting.   When stands up she feels much worse. Is quite distressing.   No change in sexual partners.       Problem list and histories reviewed & adjusted, as indicated.  Additional history: as documented    BP Readings from Last 3 Encounters:   12/05/17 124/84   10/20/17 128/80   06/02/17 129/80    Wt Readings from Last 3 Encounters:   12/05/17 152 lb (68.9 kg)   10/20/17 150 lb (68 kg)   06/02/17 150 lb (68 kg)             Reviewed and updated as needed this visit by clinical staffTobacco  Allergies  Med Hx  Surg Hx  Fam Hx  Soc Hx      Reviewed and updated as needed this visit by " Provider       ROS: 5 point ROS negative except as noted above in HPI, including Gen., Resp., CV, GI &  system review. Still smoking.     OBJECTIVE: /84 (BP Location: Left arm)  Pulse 96  Temp 97.9  F (36.6  C) (Tympanic)  Wt 152 lb (68.9 kg)  SpO2 98%  BMI 25.29 kg/m2   General: appears in some distress, tears up, normal gait  Neck: supple, no adenopathy  Heart: regular rate and rhythm, normal S1S2, no murmur  Lungs: clear to ascultation   Abd: soft, tender throughout, especially in midabdomen, with guarding, no HSM, no mass or distention, normal bowel sounds, no peritoneal sounds    Results for orders placed or performed in visit on 12/05/17   *UA reflex to Microscopic and Culture ([place] [facility] (except [place] and [place])   Result Value Ref Range    Color Urine Yellow     Appearance Urine Clear     Glucose Urine Negative NEG^Negative mg/dL    Bilirubin Urine Negative NEG^Negative    Ketones Urine Negative NEG^Negative mg/dL    Specific Gravity Urine 1.020 1.003 - 1.035    Blood Urine Trace (A) NEG^Negative    pH Urine 6.5 5.0 - 7.0 pH    Protein Albumin Urine Negative NEG^Negative mg/dL    Urobilinogen Urine 0.2 0.2 - 1.0 EU/dL    Nitrite Urine Negative NEG^Negative    Leukocyte Esterase Urine Negative NEG^Negative    Source Midstream Urine    CBC with platelets and differential   Result Value Ref Range    WBC 8.5 4.0 - 11.0 10e9/L    RBC Count 4.04 3.8 - 5.2 10e12/L    Hemoglobin 12.5 11.7 - 15.7 g/dL    Hematocrit 38.8 35.0 - 47.0 %    MCV 96 78 - 100 fl    MCH 30.9 26.5 - 33.0 pg    MCHC 32.2 31.5 - 36.5 g/dL    RDW 12.7 10.0 - 15.0 %    Platelet Count 329 150 - 450 10e9/L    Diff Method Automated Method     % Neutrophils 56.8 %    % Lymphocytes 27.3 %    % Monocytes 10.6 %    % Eosinophils 4.7 %    % Basophils 0.6 %    Absolute Neutrophil 4.8 1.6 - 8.3 10e9/L    Absolute Lymphocytes 2.3 0.8 - 5.3 10e9/L    Absolute Monocytes 0.9 0.0 - 1.3 10e9/L    Absolute Eosinophils 0.4 0.0 -  0.7 10e9/L    Absolute Basophils 0.1 0.0 - 0.2 10e9/L   ESR: Erythrocyte sedimentation rate   Result Value Ref Range    Sed Rate 9 0 - 20 mm/h   Urine Microscopic   Result Value Ref Range    WBC Urine O - 2 OTO2^O - 2 /HPF    RBC Urine 2-5 (A) OTO2^O - 2 /HPF    Squamous Epithelial /LPF Urine Few FEW^Few /LPF    Mucous Urine Present (A) NEG^Negative /LPF      ASSESSMENT:  1. Abdominal pain, generalized    2. Perioral dermatitis        PLAN:  Orders Placed This Encounter     CT Abdomen Pelvis w Contrast     *UA reflex to Microscopic and Culture ([place] [facility] (except [place] and [place])     CBC with platelets and differential     Comprehensive metabolic panel     CRP, inflammation     ESR: Erythrocyte sedimentation rate     Urine Microscopic     doxycycline (VIBRAMYCIN) 100 MG capsule     She is quite obviously distressed   CT abdomen, labs  Consider surgery consult      Patient Instructions   I recommend CT abdomen as the next step  5:30 pm today         Ivory Hayward MD

## 2017-12-05 NOTE — MR AVS SNAPSHOT
After Visit Summary   12/5/2017    Britney Marquez    MRN: [id number]           Patient Information     Date Of Birth          1985        Visit Information        Provider Department      12/5/2017 10:20 AM Ivory Hicks MD [facility]        Today's Diagnoses     Abdominal pain, generalized    -  1    Perioral dermatitis          Care Instructions    I recommend CT abdomen as the next step  5:30 pm today              Follow-ups after your visit        Your next 10 appointments already scheduled     Dec 05, 2017  5:45 PM CST   (Arrive by 5:30 PM)   CT ABDOMEN PELVIS W CONTRAST with WYCT1   [facility] CT ([facility])    [address]  [facility] [address]   [phone]           Please bring any scans or X-rays taken at other hospitals, if similar tests were done. Also bring a list of your medicines, including vitamins, minerals and over-the-counter drugs. It is safest to leave personal items at home.  Be sure to tell your doctor:   If you have any allergies.   If there s any chance you are pregnant.   If you are breastfeeding.   If you have any special needs.  You may have contrast for this exam. To prepare:   Do not eat or drink for 2 hours before your exam. If you need to take medicine, you may take it with small sips of water. (We may ask you to take liquid medicine as well.)   The day before your exam, drink extra fluids at least six 8-ounce glasses (unless your doctor tells you to restrict your fluids).  Patients over 70 or patients with diabetes or kidney problems:   If you haven t had a blood test (creatinine test) within the last 30 days, go to your clinic or Diagnostic Imaging Department for this test.  If you have diabetes:   If your kidney function is normal, continue taking your metformin (Avandamet, Glucophage, Glucovance, Metaglip) on the day of your exam.   If your kidney function is abnormal, wait 48 hours before restarting  this medicine.  You will have oral contrast for this exam:   You will drink the contrast at home. Get this from your clinic or Diagnostic Imaging Department. Please follow the directions given.  Please wear loose clothing, such as a sweat suit or jogging clothes. Avoid snaps, zippers and other metal. We may ask you to undress and put on a hospital gown.  If you have any questions, please call the Imaging Department where you will have your exam.              Future tests that were ordered for you today     Open Future Orders        Priority Expected Expires Ordered    CT Abdomen Pelvis w Contrast STAT  12/5/2018 12/5/2017            Who to contact     If you have questions or need follow up information about today's clinic visit or your schedule please contact [facility] directly at [phone].  Normal or non-critical lab and imaging results will be communicated to you by MyChart, letter or phone within 4 business days after the clinic has received the results. If you do not hear from us within 7 days, please contact the clinic through [employer] or phone. If you have a critical or abnormal lab result, we will notify you by phone as soon as possible.  Submit refill requests through [employer] or call your pharmacy and they will forward the refill request to us. Please allow 3 business days for your refill to be completed.          Additional Information About Your Visit        [employer] Information     [employer] gives you secure access to your electronic health record. If you see a primary care provider, you can also send messages to your care team and make appointments. If you have questions, please call your primary care clinic.  If you do not have a primary care provider, please call [phone] and they will assist you.        Care EveryWhere ID     This is your Care EveryWhere ID. This could be used by other organizations to access your [place] medical records  [id number]        Your Vitals Were      Pulse Temperature Pulse Oximetry BMI (Body Mass Index)          96 97.9  F (36.6  C) (Tympanic) 98% 25.29 kg/m2         Blood Pressure from Last 3 Encounters:   12/05/17 124/84   10/20/17 128/80   06/02/17 129/80    Weight from Last 3 Encounters:   12/05/17 152 lb (68.9 kg)   10/20/17 150 lb (68 kg)   06/02/17 150 lb (68 kg)              We Performed the Following     *UA reflex to Microscopic and Culture ([place] [facility] (except [place] and [place])     CBC with platelets and differential     Comprehensive metabolic panel     CRP, inflammation     ESR: Erythrocyte sedimentation rate          Today's Medication Changes          These changes are accurate as of: 12/5/17 11:06 AM.  If you have any questions, ask your nurse or doctor.               Stop taking these medicines if you haven't already. Please contact your care team if you have questions.     amoxicillin-clavulanate 875-125 MG per tablet   Commonly known as:  AUGMENTIN   Stopped by:  Ivory Hicks MD           predniSONE 20 MG tablet   Commonly known as:  DELTASONE   Stopped by:  Ivory Hicks MD                    Primary Care Provider Office Phone # Fax #    Ivory Hicks [phone]       [address] [place] [address]        Equal Access to Services     KIRSTEN GUTIÉRREZ AH: Amisha valdez hadasho Soomaali, waaxda luqadaha, qaybta kaalmada adeegyada, waxay idiin haycatrachon radha traylor. So [facility] [phone].    ATENCIÓN: Si habla español, tiene a castro disposición servicios gratuitos de asistencia lingüística. Llame al [phone].    We comply with applicable federal civil rights laws and [place] laws. We do not discriminate on the basis of race, color, national origin, age, disability, sex, sexual orientation, or gender identity.            Thank you!     Thank you for choosing [facility]  for your care. Our goal is always to provide you with excellent care. Hearing back from our  patients is one way we can continue to improve our services. Please take a few minutes to complete the written survey that you may receive in the mail after your visit with us. Thank you!             Your Updated Medication List - Protect others around you: Learn how to safely use, store and throw away your medicines at www.disposemymeds.org.          This list is accurate as of: 12/5/17 11:06 AM.  Always use your most recent med list.                   Brand Name Dispense Instructions for use Diagnosis    azelastine 0.05 % Soln ophthalmic solution    OPTIVAR    1 Bottle    Apply 1 drop to eye 2 times daily    Chronic allergic conjunctivitis       doxycycline 100 MG capsule    VIBRAMYCIN    60 capsule    Take 1 capsule (100 mg) by mouth 2 times daily    Perioral dermatitis       levocetirizine 5 MG tablet    XYZAL    30 tablet    TAKE ONE TABLET BY MOUTH EVERY EVENING    Allergic rhinitis       BEATRIZ-28 0.15-30 MG-MCG per tablet   Generic drug:  levonorgestrel-ethinyl estradiol     112 tablet    TAKE 1 TABLET BY MOUTH EVERY DAY. TAKE  ACTIVE PILLS BY MOUTH  FOR 12 WEEKS (3 MONTHS), THEN PLACEBO TABLET FOR 1 WEEK, THEN RESTART    Surveillance for birth control, oral contraceptives

## 2017-12-05 NOTE — NURSING NOTE
"Chief Complaint   Patient presents with     Abdominal Pain     pressure and pain x 2 weeks       Initial /84 (BP Location: Left arm)  Pulse 96  Temp 97.9  F (36.6  C) (Tympanic)  Wt 152 lb (68.9 kg)  SpO2 98%  BMI 25.29 kg/m2 Estimated body mass index is 25.29 kg/(m^2) as calculated from the following:    Height as of 6/2/17: 5' 5\" (1.651 m).    Weight as of this encounter: 152 lb (68.9 kg).  Medication Reconciliation: complete    Health Maintenance that is potentially due pending provider review:  NONE    n/a    Is there anyone who you would like to be able to receive your results? No  If yes have patient fill out PENNY    "

## 2017-12-08 DIAGNOSIS — R19.7 DIARRHEA: Primary | ICD-10-CM

## 2017-12-08 DIAGNOSIS — R19.7 DIARRHEA, UNSPECIFIED TYPE: ICD-10-CM

## 2017-12-08 PROCEDURE — 83516 IMMUNOASSAY NONANTIBODY: CPT | Performed by: FAMILY MEDICINE

## 2017-12-08 PROCEDURE — 36415 COLL VENOUS BLD VENIPUNCTURE: CPT | Performed by: FAMILY MEDICINE

## 2017-12-08 PROCEDURE — 87209 SMEAR COMPLEX STAIN: CPT | Performed by: FAMILY MEDICINE

## 2017-12-08 PROCEDURE — 86003 ALLG SPEC IGE CRUDE XTRC EA: CPT | Performed by: FAMILY MEDICINE

## 2017-12-08 PROCEDURE — 87506 IADNA-DNA/RNA PROBE TQ 6-11: CPT | Performed by: FAMILY MEDICINE

## 2017-12-08 PROCEDURE — 87177 OVA AND PARASITES SMEARS: CPT | Performed by: FAMILY MEDICINE

## 2017-12-08 PROCEDURE — 83516 IMMUNOASSAY NONANTIBODY: CPT | Mod: 91 | Performed by: FAMILY MEDICINE

## 2017-12-09 PROCEDURE — 87209 SMEAR COMPLEX STAIN: CPT | Performed by: FAMILY MEDICINE

## 2017-12-09 PROCEDURE — 87177 OVA AND PARASITES SMEARS: CPT | Performed by: FAMILY MEDICINE

## 2017-12-11 ENCOUNTER — OFFICE VISIT (OUTPATIENT)
Dept: OBGYN | Facility: CLINIC | Age: 32
End: 2017-12-11
Payer: COMMERCIAL

## 2017-12-11 VITALS
DIASTOLIC BLOOD PRESSURE: 80 MMHG | HEIGHT: 65 IN | HEART RATE: 69 BPM | SYSTOLIC BLOOD PRESSURE: 132 MMHG | BODY MASS INDEX: 25.33 KG/M2 | WEIGHT: 152 LBS

## 2017-12-11 DIAGNOSIS — R19.7 DIARRHEA: ICD-10-CM

## 2017-12-11 DIAGNOSIS — M62.89 PFD (PELVIC FLOOR DYSFUNCTION): Primary | ICD-10-CM

## 2017-12-11 LAB
CLAM IGE QN: <0.1 KU(A)/L
CODFISH IGE QN: <0.1 KU(A)/L
CORN IGE QN: <0.1 KU(A)/L
COW MILK IGE QN: <0.1 KU/L
EGG WHITE IGE QN: <0.1 KU(A)/L
PEANUT IGE QN: <0.1 KU(A)/L
SCALLOP IGE QN: <0.1 KU(A)/L
SHRIMP IGE QN: <0.1 KU(A)/L
SOYBEAN IGE QN: <0.1 KU(A)/L
TTG IGA SER-ACNC: 1 U/ML
TTG IGG SER-ACNC: <1 U/ML
WALNUT IGE QN: <0.1 KU(A)/L
WHEAT IGE QN: <0.1 KU(A)/L

## 2017-12-11 PROCEDURE — 76830 TRANSVAGINAL US NON-OB: CPT | Performed by: OBSTETRICS & GYNECOLOGY

## 2017-12-11 PROCEDURE — 99203 OFFICE O/P NEW LOW 30 MIN: CPT | Mod: 25 | Performed by: OBSTETRICS & GYNECOLOGY

## 2017-12-11 RX ORDER — AMITRIPTYLINE HYDROCHLORIDE 10 MG/1
TABLET ORAL
Qty: 45 TABLET | Refills: 1 | Status: SHIPPED | OUTPATIENT
Start: 2017-12-11 | End: 2017-12-22

## 2017-12-11 RX ORDER — DIAZEPAM 5 MG
TABLET ORAL
Qty: 30 TABLET | Refills: 1 | Status: SHIPPED | OUTPATIENT
Start: 2017-12-11 | End: 2017-12-22

## 2017-12-11 NOTE — PROGRESS NOTES
"Britney is a 32 year old   female who presents for consult as requested by Dr. Viera; she is a vague historian; she states a long h/o \"problem down here\" (pointing to lower abdomen); prone to loose stooling; mesnes were irregular, so she was changed from cyclic BCP to continous about 3 years ago; still has irregular cycles; not sure if pain changes with bleeding; has a long h/o pain with use of a tampon and some deep dyspareunia, but is vague about menstrual cramping  Now in past 4 weeks, pain has become constant, a lot of pressure and bloating, L >R, radiating into left thigh to knee; has diarrhea (not bloody); no fever, chills or foul vaginal odor; no foreign travel; no known Celiac disease in family (testing pending)  She had CT scan done recently which was WNL.  She denies pain when bladder full or painful urination, but feels like she has to go all the time, with pressure.  Painful to sit comfortably.    Patient Active Problem List    Diagnosis Date Noted     S/P LEEP of cervix 2013     Priority: Medium     2002:ECC revealing focal ALLEY-3       CARDIOVASCULAR SCREENING; LDL GOAL LESS THAN 160 10/31/2010     Priority: Medium     Mild recurrent major depression (H) 10/24/2010     Priority: Medium     GERD (gastroesophageal reflux disease) 2010     Priority: Medium     Wrist injury 2008     Priority: Medium       All systems were reviewed and pertinent information in noted in subjective/HPI.    Past Medical History:   Diagnosis Date     Mental disorders of mother, postpartum      Papanicolaou smear of cervix with atypical squamous cells of undetermined significance (ASC-US) 2001     S/P LEEP of cervix 2002    ECC revealing focal ALLEY-3- repeat pap in 2002 was normal (pt was less than 20 yrs of age)       Past Surgical History:   Procedure Laterality Date     C  DELIVERY ONLY  3/2/2003    Low transverse  section -[facility]-     SURGICAL HISTORY OF -       LEEP ECC " "Showed HPV effect     SURGICAL HISTORY OF -       EEC ALLEY-III  pregnacy repeat pap         Current Outpatient Prescriptions:      doxycycline (VIBRAMYCIN) 100 MG capsule, Take 1 capsule (100 mg) by mouth 2 times daily, Disp: 60 capsule, Rfl: 1     BEATRIZ-28 0.15-30 MG-MCG per tablet, TAKE 1 TABLET BY MOUTH EVERY DAY. TAKE  ACTIVE PILLS BY MOUTH  FOR 12 WEEKS (3 MONTHS), THEN PLACEBO TABLET FOR 1 WEEK, THEN RESTART, Disp: 112 tablet, Rfl: 1     levocetirizine (XYZAL) 5 MG tablet, TAKE ONE TABLET BY MOUTH EVERY EVENING, Disp: 30 tablet, Rfl: 1     azelastine (OPTIVAR) 0.05 % SOLN, Apply 1 drop to eye 2 times daily, Disp: 1 Bottle, Rfl: 2    ALLERGIES:  Review of patient's allergies indicates no known allergies.    Social History     Social History     Marital status: Single     Spouse name: N/A     Number of children: N/A     Years of education: N/A     Social History Main Topics     Smoking status: Current Every Day Smoker     Packs/day: 0.25     Years: 17.00     Types: Cigarettes     Smokeless tobacco: Never Used     Alcohol use Yes     Drug use: No     Sexual activity: Yes     Partners: Male     Birth control/ protection: Pill     Other Topics Concern     Parent/Sibling W/ Cabg, Mi Or Angioplasty Before 65f 55m? No     Social History Narrative       Family History   Problem Relation Age of Onset     Allergies Mother      Respiratory Mother      asthma     Hypertension Father        OBJECTIVE:  Vitals: /80 (BP Location: Right arm, Patient Position: Chair, Cuff Size: Adult Small)  Pulse 69  Ht 5' 5\" (1.651 m)  Wt 152 lb (68.9 kg)  BMI 25.29 kg/m2 BMI= Body mass index is 25.29 kg/(m^2).   No LMP recorded. Patient is not currently having periods (Reason: Birth Control).     GENERAL APPEARANCE: appears uncomfortable; sitting with discomfort     ABDOMEN: soft, nondistended; reports tenderness LLQ without guarding or rebound; no organomegaly or hernia  PELVIC:  No external tenderness; intact anal wink  No " introital tenderness; extreme trigger point like tenderness along left pelvic side wall, with movement of cervix and particularly obturator foramen; mildly point tenderness right sidewall  No adnexal masses    .Transvaginal sonogram performed: uterus RV; normal endometrial stripe; no free fluid; bilateral ovaries WNL; active bowel peristalsis noted          ASSESSMENT:      ICD-10-CM    1. 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 Viera    Duration of visit:  30 minutes, >50% in discussion of current issues, treatment options and treatment planning.  CHELITA MURPHY MD

## 2017-12-11 NOTE — MR AVS SNAPSHOT
"              After Visit Summary   12/11/2017    Britney Marquez    MRN: [id number]           Patient Information     Date Of Birth          1985        Visit Information        Provider Department      12/11/2017 10:00 AM Regine Prasad MD [facility]        Today's Diagnoses     PFD (pelvic floor dysfunction)    -  1       Follow-ups after your visit        Additional Services     PHYSICAL THERAPY REFERRAL       *This therapy referral will be filtered to a centralized scheduling office at [facility] and the patient will receive a call to schedule an appointment at a [facility] location most convenient for them. *     [facility] provides Physical Therapy evaluation and treatment and many specialty services across the [facility] system.  If requesting a specialty program, please choose from the list below.    If you have not heard from the scheduling office within 2 business days, please call [phone] for all locations, with the exception of [place], please call [phone].  Treatment: Evaluation & Treatment  Special Instructions/Modalities: pelvic floor   Special Programs: pelvic floor    Please be aware that coverage of these services is subject to the terms and limitations of your health insurance plan.  Call member services at your health plan with any benefit or coverage questions.      **Note to Provider:  If you are referring outside of [facility] for the therapy appointment, please list the name of the location in the \"special instructions\" above, print the referral and give to the patient to schedule the appointment.                  Who to contact     If you have questions or need follow up information about today's clinic visit or your schedule please contact [facility] directly at [phone].  Normal or non-critical lab and imaging results will be communicated to you by MyChart, letter or phone within 4 business " "days after the clinic has received the results. If you do not hear from us within 7 days, please contact the clinic through [employer] or phone. If you have a critical or abnormal lab result, we will notify you by phone as soon as possible.  Submit refill requests through [employer] or call your pharmacy and they will forward the refill request to us. Please allow 3 business days for your refill to be completed.          Additional Information About Your Visit        [employer] Information     [employer] gives you secure access to your electronic health record. If you see a primary care provider, you can also send messages to your care team and make appointments. If you have questions, please call your primary care clinic.  If you do not have a primary care provider, please call [phone] and they will assist you.        Care EveryWhere ID     This is your Care EveryWhere ID. This could be used by other organizations to access your [facility] medical records  [id number]        Your Vitals Were     Pulse Height BMI (Body Mass Index)             69 5' 5\" (1.651 m) 25.29 kg/m2          Blood Pressure from Last 3 Encounters:   12/11/17 132/80   12/05/17 124/84   10/20/17 128/80    Weight from Last 3 Encounters:   12/11/17 152 lb (68.9 kg)   12/05/17 152 lb (68.9 kg)   10/20/17 150 lb (68 kg)              We Performed the Following     PHYSICAL THERAPY REFERRAL          Today's Medication Changes          These changes are accurate as of: 12/11/17 10:37 AM.  If you have any questions, ask your nurse or doctor.               Start taking these medicines.        Dose/Directions    amitriptyline 10 MG tablet   Commonly known as:  ELAVIL   Used for:  PFD (pelvic floor dysfunction)   Started by:  Regine Prasad MD        1/2 tab po QHS x 7 days then 1 tab po QHS   Quantity:  45 tablet   Refills:  1       diazepam 5 MG tablet   Commonly known as:  VALIUM   Used for:  PFD (pelvic floor dysfunction)   Started by:  Osmar" Regine Hook MD        1 tab vaginally twice a day   Quantity:  30 tablet   Refills:  1            Where to get your medicines      These medications were sent to [facility] - [place], [place] - [address] [address]     Phone:  [phone]     amitriptyline 10 MG tablet         Some of these will need a paper prescription and others can be bought over the counter.  Ask your nurse if you have questions.     Bring a paper prescription for each of these medications     diazepam 5 MG tablet                Primary Care Provider Office Phone # Fax #    Ivory Hicks [phone]       [address] [facility] [address]        Equal Access to Services     KIRSTEN GUTIÉRREZ : Hadyg Riley, waaxda nena, qaybta kaalmada jacob, robin traylor. So [facility] [phone].    ATENCIÓN: Si habla español, tiene a castro disposición servicios gratuitos de asistencia lingüística. Llame al [phone].    We comply with applicable federal civil rights laws and [place] laws. We do not discriminate on the basis of race, color, national origin, age, disability, sex, sexual orientation, or gender identity.            Thank you!     Thank you for choosing [facility]  for your care. Our goal is always to provide you with excellent care. Hearing back from our patients is one way we can continue to improve our services. Please take a few minutes to complete the written survey that you may receive in the mail after your visit with us. Thank you!             Your Updated Medication List - Protect others around you: Learn how to safely use, store and throw away your medicines at www.disposemymeds.org.          This list is accurate as of: 12/11/17 10:37 AM.  Always use your most recent med list.                   Brand Name Dispense Instructions for use Diagnosis    amitriptyline 10 MG tablet    ELAVIL    45 tablet    1/2  tab po QHS x 7 days then 1 tab po QHS    PFD (pelvic floor dysfunction)       azelastine 0.05 % Soln ophthalmic solution    OPTIVAR    1 Bottle    Apply 1 drop to eye 2 times daily    Chronic allergic conjunctivitis       diazepam 5 MG tablet    VALIUM    30 tablet    1 tab vaginally twice a day    PFD (pelvic floor dysfunction)       doxycycline 100 MG capsule    VIBRAMYCIN    60 capsule    Take 1 capsule (100 mg) by mouth 2 times daily    Perioral dermatitis       levocetirizine 5 MG tablet    XYZAL    30 tablet    TAKE ONE TABLET BY MOUTH EVERY EVENING    Allergic rhinitis       BEATRIZ-28 0.15-30 MG-MCG per tablet   Generic drug:  levonorgestrel-ethinyl estradiol     112 tablet    TAKE 1 TABLET BY MOUTH EVERY DAY. TAKE  ACTIVE PILLS BY MOUTH  FOR 12 WEEKS (3 MONTHS), THEN PLACEBO TABLET FOR 1 WEEK, THEN RESTART    Surveillance for birth control, oral contraceptives

## 2017-12-11 NOTE — NURSING NOTE
"Initial /80 (BP Location: Right arm, Patient Position: Chair, Cuff Size: Adult Small)  Pulse 69  Ht 5' 5\" (1.651 m)  Wt 152 lb (68.9 kg)  BMI 25.29 kg/m2 Estimated body mass index is 25.29 kg/(m^2) as calculated from the following:    Height as of this encounter: 5' 5\" (1.651 m).    Weight as of this encounter: 152 lb (68.9 kg). .      "

## 2017-12-12 LAB
C COLI+JEJUNI+LARI FUSA STL QL NAA+PROBE: ABNORMAL
O+P STL MICRO: NORMAL
SPECIMEN SOURCE: NORMAL
SPECIMEN SOURCE: NORMAL

## 2017-12-22 ENCOUNTER — OFFICE VISIT (OUTPATIENT)
Dept: OBGYN | Facility: CLINIC | Age: 32
End: 2017-12-22
Payer: COMMERCIAL

## 2017-12-22 VITALS
RESPIRATION RATE: 16 BRPM | BODY MASS INDEX: 24.16 KG/M2 | DIASTOLIC BLOOD PRESSURE: 83 MMHG | HEIGHT: 65 IN | SYSTOLIC BLOOD PRESSURE: 128 MMHG | WEIGHT: 145 LBS | HEART RATE: 80 BPM | TEMPERATURE: 98.3 F

## 2017-12-22 DIAGNOSIS — M62.89 PFD (PELVIC FLOOR DYSFUNCTION): ICD-10-CM

## 2017-12-22 PROCEDURE — 99213 OFFICE O/P EST LOW 20 MIN: CPT | Performed by: OBSTETRICS & GYNECOLOGY

## 2017-12-22 RX ORDER — AMITRIPTYLINE HYDROCHLORIDE 10 MG/1
TABLET ORAL
Qty: 90 TABLET | Refills: 3 | Status: SHIPPED | OUTPATIENT
Start: 2017-12-22 | End: 2019-06-07

## 2017-12-22 RX ORDER — DIAZEPAM 5 MG
TABLET ORAL
Qty: 180 TABLET | Refills: 1 | Status: SHIPPED | OUTPATIENT
Start: 2017-12-22 | End: 2018-08-09

## 2017-12-22 NOTE — PROGRESS NOTES
Britney is a 32 year old   female who presents for f/u of treatment for pelvic floor dysfunction; she is using the vaginal diazepam twice a day, felt relief after 1st dose, then wears off after about 10 hours; taking Amitriptyline at bedtime; awakens with some drowsiness so want to hold at 10mg dose  Has appt with Analisa PT, in January.    Patient Active Problem List    Diagnosis Date Noted     PFD (pelvic floor dysfunction) 2017     Priority: Medium     S/P LEEP of cervix 2013     Priority: Medium     2002:ECC revealing focal ALLEY-3       CARDIOVASCULAR SCREENING; LDL GOAL LESS THAN 160 10/31/2010     Priority: Medium     Mild recurrent major depression (H) 10/24/2010     Priority: Medium     GERD (gastroesophageal reflux disease) 2010     Priority: Medium     Wrist injury 2008     Priority: Medium       All systems were reviewed and pertinent information in noted in subjective/HPI.    Past Medical History:   Diagnosis Date     Mental disorders of mother, postpartum      Papanicolaou smear of cervix with atypical squamous cells of undetermined significance (ASC-US) 2001     S/P LEEP of cervix 2002    ECC revealing focal ALLEY-3- repeat pap in 2002 was normal (pt was less than 20 yrs of age)       Past Surgical History:   Procedure Laterality Date     C  DELIVERY ONLY  3/2/2003    Low transverse  section -[facility]-     SURGICAL HISTORY OF -       LEEP ECC Showed HPV effect     SURGICAL HISTORY OF -       EEC ALLEY-III  pregnacy repeat pap         Current Outpatient Prescriptions:      diazepam (VALIUM) 5 MG tablet, 1 tab vaginally twice a day, Disp: 180 tablet, Rfl: 1     amitriptyline (ELAVIL) 10 MG tablet, 1 po QHS, Disp: 90 tablet, Rfl: 3     doxycycline (VIBRAMYCIN) 100 MG capsule, Take 1 capsule (100 mg) by mouth 2 times daily, Disp: 60 capsule, Rfl: 1     BEATRIZ-28 0.15-30 MG-MCG per tablet, TAKE 1 TABLET BY MOUTH EVERY DAY. TAKE  ACTIVE PILLS BY MOUTH  FOR  "12 WEEKS (3 MONTHS), THEN PLACEBO TABLET FOR 1 WEEK, THEN RESTART, Disp: 112 tablet, Rfl: 1     levocetirizine (XYZAL) 5 MG tablet, TAKE ONE TABLET BY MOUTH EVERY EVENING, Disp: 30 tablet, Rfl: 1     azelastine (OPTIVAR) 0.05 % SOLN, Apply 1 drop to eye 2 times daily, Disp: 1 Bottle, Rfl: 2     [DISCONTINUED] amitriptyline (ELAVIL) 10 MG tablet, 1/2 tab po QHS x 7 days then 1 tab po QHS, Disp: 45 tablet, Rfl: 1    ALLERGIES:  Review of patient's allergies indicates no known allergies.    Social History     Social History     Marital status: Single     Spouse name: N/A     Number of children: N/A     Years of education: N/A     Social History Main Topics     Smoking status: Current Every Day Smoker     Packs/day: 0.25     Years: 17.00     Types: Cigarettes     Smokeless tobacco: Never Used     Alcohol use Yes     Drug use: No     Sexual activity: Yes     Partners: Male     Birth control/ protection: Pill     Other Topics Concern     Parent/Sibling W/ Cabg, Mi Or Angioplasty Before 65f 55m? No     Social History Narrative       Family History   Problem Relation Age of Onset     Allergies Mother      Respiratory Mother      asthma     Hypertension Father        OBJECTIVE:  Vitals: /83 (BP Location: Right arm, Patient Position: Chair, Cuff Size: Adult Small)  Pulse 80  Temp 98.3  F (36.8  C) (Oral)  Resp 16  Ht 5' 5\" (1.651 m)  Wt 145 lb (65.8 kg)  BMI 24.13 kg/m2 BMI= Body mass index is 24.13 kg/(m^2).   No LMP recorded. Patient is not currently having periods (Reason: Birth Control).     GENERAL APPEARANCE: healthy, alert and no distress  PELVIC:  Sidewalls much less tender; no specific trigger points identified    ASSESSMENT:      ICD-10-CM    1. PFD (pelvic floor dysfunction) M62.89 diazepam (VALIUM) 5 MG tablet     amitriptyline (ELAVIL) 10 MG tablet       PLAN:  Continue vaginal valium and Amitryptyline at current dose; f/u with me after completes pelvic floor PT  Regine Prasad MD  [facility]      Regine Prasad MD

## 2017-12-22 NOTE — NURSING NOTE
"Initial /83 (BP Location: Right arm, Patient Position: Chair, Cuff Size: Adult Small)  Pulse 80  Temp 98.3  F (36.8  C) (Oral)  Resp 16  Ht 5' 5\" (1.651 m)  Wt 145 lb (65.8 kg)  BMI 24.13 kg/m2 Estimated body mass index is 24.13 kg/(m^2) as calculated from the following:    Height as of this encounter: 5' 5\" (1.651 m).    Weight as of this encounter: 145 lb (65.8 kg). .      "

## 2017-12-22 NOTE — MR AVS SNAPSHOT
After Visit Summary   12/22/2017    Britney Marquez    MRN: [id number]           Patient Information     Date Of Birth          1985        Visit Information        Provider Department      12/22/2017 1:30 PM Regine Prasad MD [facility]        Today's Diagnoses     PFD (pelvic floor dysfunction)           Follow-ups after your visit        Your next 10 appointments already scheduled     Chapo 10, 2018  9:30 AM [facility]   Women's Health Eval with Solange Montano PT   [facility] ([facility])    [address] [place] [address]   [phone]              Who to contact     If you have questions or need follow up information about today's clinic visit or your schedule please contact [facility] directly at [phone].  Normal or non-critical lab and imaging results will be communicated to you by MyChart, letter or phone within 4 business days after the clinic has received the results. If you do not hear from us within 7 days, please contact the clinic through MyChart or phone. If you have a critical or abnormal lab result, we will notify you by phone as soon as possible.  Submit refill requests through [employer] or call your pharmacy and they will forward the refill request to us. Please allow 3 business days for your refill to be completed.          Additional Information About Your Visit        MyChart Information     [employer] gives you secure access to your electronic health record. If you see a primary care provider, you can also send messages to your care team and make appointments. If you have questions, please call your primary care clinic.  If you do not have a primary care provider, please call [phone] and they will assist you.        Care EveryWhere ID     This is your Care EveryWhere ID. This could be used by other organizations to access your [facility] medical records  [id number]        Your  "Vitals Were     Pulse Temperature Respirations Height BMI (Body Mass Index)       80 98.3  F (36.8  C) (Oral) 16 5' 5\" (1.651 m) 24.13 kg/m2        Blood Pressure from Last 3 Encounters:   12/22/17 128/83   12/11/17 132/80   12/05/17 124/84    Weight from Last 3 Encounters:   12/22/17 145 lb (65.8 kg)   12/11/17 152 lb (68.9 kg)   12/05/17 152 lb (68.9 kg)              Today, you had the following     No orders found for display         Today's Medication Changes          These changes are accurate as of: 12/22/17  1:50 PM.  If you have any questions, ask your nurse or doctor.               These medicines have changed or have updated prescriptions.        Dose/Directions    amitriptyline 10 MG tablet   Commonly known as:  ELAVIL   This may have changed:  additional instructions   Used for:  PFD (pelvic floor dysfunction)   Changed by:  Regine Prasad MD        1 po QHS   Quantity:  90 tablet   Refills:  3            Where to get your medicines      These medications were sent to [facility] [place] [address]     Phone:  [phone]     amitriptyline 10 MG tablet         Some of these will need a paper prescription and others can be bought over the counter.  Ask your nurse if you have questions.     Bring a paper prescription for each of these medications     diazepam 5 MG tablet                Primary Care Provider Office Phone # Fax #    Ivory Hicks [phone]       [address] [place] [address]        Equal Access to Services     [facility] HospitalCHRISTIANO AH: Hadii abel valdez hadasho Soomaali, waaxda luqadaha, qaybta kaalmada adeegyada, waxay idimg traylor. So [facility] [phone].    ATENCIÓN: Si habla español, tiene a castro disposición servicios gratuitos de asistencia lingüística. Llame al [phone].    We comply with applicable federal civil rights laws and [place] laws. We do not discriminate on the " basis of race, color, national origin, age, disability, sex, sexual orientation, or gender identity.            Thank you!     Thank you for choosing [facility]  for your care. Our goal is always to provide you with excellent care. Hearing back from our patients is one way we can continue to improve our services. Please take a few minutes to complete the written survey that you may receive in the mail after your visit with us. Thank you!             Your Updated Medication List - Protect others around you: Learn how to safely use, store and throw away your medicines at www.disposemymeds.org.          This list is accurate as of: 12/22/17  1:50 PM.  Always use your most recent med list.                   Brand Name Dispense Instructions for use Diagnosis    amitriptyline 10 MG tablet    ELAVIL    90 tablet    1 po QHS    PFD (pelvic floor dysfunction)       azelastine 0.05 % Soln ophthalmic solution    OPTIVAR    1 Bottle    Apply 1 drop to eye 2 times daily    Chronic allergic conjunctivitis       diazepam 5 MG tablet    VALIUM    180 tablet    1 tab vaginally twice a day    PFD (pelvic floor dysfunction)       doxycycline 100 MG capsule    VIBRAMYCIN    60 capsule    Take 1 capsule (100 mg) by mouth 2 times daily    Perioral dermatitis       levocetirizine 5 MG tablet    XYZAL    30 tablet    TAKE ONE TABLET BY MOUTH EVERY EVENING    Allergic rhinitis       BEATRIZ-28 0.15-30 MG-MCG per tablet   Generic drug:  levonorgestrel-ethinyl estradiol     112 tablet    TAKE 1 TABLET BY MOUTH EVERY DAY. TAKE  ACTIVE PILLS BY MOUTH  FOR 12 WEEKS (3 MONTHS), THEN PLACEBO TABLET FOR 1 WEEK, THEN RESTART    Surveillance for birth control, oral contraceptives

## 2018-01-10 ENCOUNTER — TELEPHONE (OUTPATIENT)
Dept: OBGYN | Facility: CLINIC | Age: 33
End: 2018-01-10

## 2018-01-10 ENCOUNTER — HOSPITAL ENCOUNTER (OUTPATIENT)
Dept: PHYSICAL THERAPY | Facility: CLINIC | Age: 33
Setting detail: THERAPIES SERIES
End: 2018-01-10
Attending: OBSTETRICS & GYNECOLOGY
Payer: COMMERCIAL

## 2018-01-10 PROCEDURE — 97110 THERAPEUTIC EXERCISES: CPT | Mod: GP | Performed by: PHYSICAL THERAPIST

## 2018-01-10 PROCEDURE — 97162 PT EVAL MOD COMPLEX 30 MIN: CPT | Mod: GP | Performed by: PHYSICAL THERAPIST

## 2018-01-10 PROCEDURE — 40000841 ZZH STATISTIC WOMEN'S HEALTH VISIT: Performed by: PHYSICAL THERAPIST

## 2018-01-10 NOTE — TELEPHONE ENCOUNTER
Neeru, Mora Buckley MD                        She needs to stop using for a week or try benadryl   Mora Siddiqi MD            Previous Messages       ----- Message -----      From: Solange Montano, PT      Sent: 1/10/2018   9:55 AM        To: Mericle Oo Pool, Van Lashawn Oo Pool   Subject: Allergy to Valium?                               Hello,   I am currently with Britney and she is asking if she could be allergic to vaginal valium.  She is itchy and she notices that she has hives. She is not sure if it is the valium and does not want to quit taking it, because it is helping her symptoms. Any advice?  Please feel free to contact pt and leave message.  [phone]     Thanks,   Analisa Montano, PT, MA  #[id number]          Patient called- left message to call back. Please let her know Dr. Lr's recommendation to stop Valium x1 week or to try using Benadryl.    Julia CHOI   [facility] RN

## 2018-01-11 ENCOUNTER — MYC MEDICAL ADVICE (OUTPATIENT)
Dept: PHYSICAL THERAPY | Facility: CLINIC | Age: 33
End: 2018-01-11

## 2018-01-11 NOTE — PROGRESS NOTES
"Physical Therapy Initial Pelvic Floor Muscle Evaluation   01/10/18 0900   General Information   Type of Visit Initial OP Ortho PT Evaluation   Start of Care Date 01/10/18   Referring Physician Regine Prasad MD   Patient/Family Goals Statement Get rid of pain and leaking   Orders Evaluate and Treat   Date of Order 12/11/17   Insurance Type [employer]   Insurance Comments/Visits Authorized 40 auth'd   Medical Diagnosis PFD (pelvic floor dysfunction)   Surgical/Medical history reviewed Yes   Precautions/Limitations no known precautions/limitations   General Information Comments PMHx: no surgeries, generally healthy   Body Part(s)   Body Part(s) Pelvic Floor Dysfunction   Presentation and Etiology   Pertinent history of current problem (include personal factors and/or comorbidities that impact the POC) Pt states she has been on BCP for 3 yrs, and reports symptoms may have started around that time. Recently in past 3-4 months she has had unbearable pain that made her change how she sits. Pt states she has had weird \"hot flash\" episodes in the summer of 2017.  Symptoms happen when she is away from home and is on her feet for more than 10 mins. The pain can be nauseating and very limiting to the point she doesn't want to walk or be on her feet for any length of time.  Was seen by OB/GYN for full work up and was given vaginal valium and this has helped her urgency and leaking episodes.    Impairments A. Pain;P. Bowel or bladder problems;R. Other   Impairment comment Nausea, was leaking urnine with urge prior to valium rx.   Functional Limitations perform activities of daily living;perform desired leisure / sports activities   Symptom Location Pelvic floor, vagina, lower abdomen   How/Where did it occur From insidious onset   Onset date of current episode/exacerbation 10/15/17   Chronicity Chronic   Best (/10) 4   Worst (/10) 10  (without valium)   Pain quality C. Aching;E. Shooting;G. Cramping   Frequency of " "pain/symptoms A. Constant   Pain/symptoms are: The same all the time   Pain/symptoms exacerbated by A. Sitting;B. Walking;C. Lifting   Pain/symptoms eased by C. Rest   Progression of symptoms since onset: Improved  (with valium, in no valium then bad again)   Prior Level of Function   Functional Level Prior Comment Before taking BCP was feeling \"pretty normal.\"    Current Level of Function   Current Community Support Family/friend caregiver   Patient role/employment history A. 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 No   Have you fallen and had an injury in the past year? No   Is patient a fall risk? No   Functional Scales   Q1: Stand for 1 hour (to get ready for work) painfree   Q1(/10): 0/10   Q2: Walking for Grocery Shopping   Q2( /10): 2/10   Q3: Painfree Sexual Whitaker   Q3( /10): 0/10   Pelvic Floor Dysfunction Questions   Regular exercise No  (has exer machines in her apt; limited tolerance for bike,run)   Fluid intake-glasses/day (one glass/cup = 8oz water = 8-9  of the 12.5 oz bottles   Caffeinated beverages-glasses/day None, takes caffeine pill (1) before work   Alcoholic beverages - glasses/day Occasional on weekends   Recent diet change? No   How long can you delay the need to urinate?  without use of valium = immediate; with valium = 20 mins   How many times do you wake to urinate at night?   1   How often do you urinate during the day?   Every 3 hours   Can you stop the flow of urine when on the toilet?  Yes   Is the volume of urine passed usually  Medium   Do you have the sensation that you need to go to the toilet?  Yes   Do you empty your bladder frequently, before you experience the urge to pass urine?  No   Do you have \"triggers\" that make you " "feel you can't wait to go to the toilet?  No   Number of bladder infections last year?  0   Frequency of bowel movements:  Daily    Consistency of stool?  Soft   Do you ignore the urge to defecate?  No   Women's Health Questions   Number of pregnancies  1   Number of vaginal deliveries  0   Number of  section deliveries  1   Weight of largest baby  8lbs 10 oz   Number of episiotomies  0   Pelvic Floor Dysfunction Objective Findings   Pain-pelvic dysfunction Pelvic pain   Observation Shifting in seat often, and tends to try and sit more on ischial tuberosities.    Posture Generally upright and good level awareness. Mild anterior pelvic tilt in static stand.   Areas of Tightness Adductors;Iliopsoas;Hamstrings;Piriformis   Abdominal Wall (guards with palpation)   Type of Storage Problem urge incontinence   Type of Emptying Problem (when not on valium - urgency, frequency)   Protection needed Pad;Number of pads per day   Power (MMT at [facility]) PFM assessment not manually completed today d/t time constraints of session.   Medications Other   Pelvic \"I am very sensitive 'down there' and I also have sensitive skin everywhere. I have lots of allergies.\"   Pad Pantiliner  (\"Always, just in case.\" (?))   Number of pads 1   Medication comment Vaginal valium has been most helpful, but thinks she may be allergic. MD office contacted with this concern.   Planned Therapy Interventions   Planned Therapy Interventions joint mobilization;manual therapy;motor coordination training;neuromuscular re-education;strengthening;stretching   Planned Modality Interventions   Planned Modality Interventions Biofeedback;Electrical stimulation   Clinical Impression   Criteria for Skilled Therapeutic Interventions Met yes, treatment indicated   PT Diagnosis Impaired function at the Pelvic Floor Muscle   Influenced by the following impairments Pain, poor knowledge of anatomy and use of PFM, functional weakness in PFM (per subjective " report of symptoms).   Functional limitations due to impairments Incontinence of urine, pain with standing/walking/prolonged on feet, and painful sexual intercourse   Clinical Presentation Evolving/Changing   Clinical Presentation Rationale Pt reports worsening of symptoms in past 6 mos, better with vaginal valium - but worse again if not taken, multiple comorbidities with allergies, anxiety   Clinical Decision Making (Complexity) Moderate complexity   Therapy Frequency 1 time/week   Predicted Duration of Therapy Intervention (days/wks) 8 weeks for up to 8 sessions   Risk & Benefits of therapy have been explained Yes   Patient, Family & other staff in agreement with plan of care Yes   Clinical Impression Comments Pt presents with Pelvic Pain of unknown origin - but pt attributes to starting after taking BCP 3 yrs ago. Currently unable to shop in grocery store, stand for personal cares, and if not taking valium regularly will get incontinence of urine. Pt could benefit from skilled PT to improve awareness and control of her PFM to decrease pain and need for valium.   Education Assessment   Preferred Learning Style Listening;Reading;Demonstration;Pictures/video   Barriers to Learning No barriers   Ortho Goal 1   Goal Identifier STG   Goal Description 1)Pt will imrpove control of PFM to report pain at 2/10 with standing tasks/personal care, in 4 weeks.   Target Date 02/07/18   Ortho Goal 2   Goal Identifier STG   Goal Description 2)Pt will report ability to shop for groceries for 15 mins in store, in 4 weeks.   Target Date 02/07/18   Ortho Goal 3   Goal Identifier LTG   Goal Description 3)Pt will report painfree standing to get ready for work (1 hour) in 8 weeks.   Target Date 03/07/18   Ortho Goal 4   Goal Identifier LTG   Goal Description 4)Pt will report 2/10 or less pain with sexual intercourse in 8 weeks.   Target Date 03/07/18   Ortho Goal 5   Goal Identifier LTG   Goal Description 5)Pt will be ind in HEP to  prevent return of symptoms in 8 weeks.   Target Date 03/07/18   Total Evaluation Time   Total Evaluation Time 30   Thank you for the referral of this patient.  Solange Montano, PT, MA  #[id number]

## 2018-01-12 ENCOUNTER — MYC MEDICAL ADVICE (OUTPATIENT)
Dept: FAMILY MEDICINE | Facility: CLINIC | Age: 33
End: 2018-01-12

## 2018-01-17 ENCOUNTER — TRANSFERRED RECORDS (OUTPATIENT)
Dept: HEALTH INFORMATION MANAGEMENT | Facility: CLINIC | Age: 33
End: 2018-01-17

## 2018-01-18 ENCOUNTER — HOSPITAL ENCOUNTER (OUTPATIENT)
Dept: PHYSICAL THERAPY | Facility: CLINIC | Age: 33
Setting detail: THERAPIES SERIES
End: 2018-01-18
Attending: OBSTETRICS & GYNECOLOGY
Payer: COMMERCIAL

## 2018-01-18 DIAGNOSIS — M62.89 PFD (PELVIC FLOOR DYSFUNCTION): Primary | ICD-10-CM

## 2018-01-18 PROCEDURE — 40000841 ZZH STATISTIC WOMEN'S HEALTH VISIT: Performed by: PHYSICAL THERAPIST

## 2018-01-18 PROCEDURE — 97110 THERAPEUTIC EXERCISES: CPT | Mod: GP | Performed by: PHYSICAL THERAPIST

## 2018-01-18 PROCEDURE — 97140 MANUAL THERAPY 1/> REGIONS: CPT | Mod: GP | Performed by: PHYSICAL THERAPIST

## 2018-01-31 ENCOUNTER — HOSPITAL ENCOUNTER (OUTPATIENT)
Dept: PHYSICAL THERAPY | Facility: CLINIC | Age: 33
Setting detail: THERAPIES SERIES
End: 2018-01-31
Attending: OBSTETRICS & GYNECOLOGY
Payer: COMMERCIAL

## 2018-01-31 PROCEDURE — 97140 MANUAL THERAPY 1/> REGIONS: CPT | Mod: GP | Performed by: PHYSICAL THERAPIST

## 2018-01-31 PROCEDURE — 97110 THERAPEUTIC EXERCISES: CPT | Mod: GP | Performed by: PHYSICAL THERAPIST

## 2018-01-31 PROCEDURE — 40000841 ZZH STATISTIC WOMEN'S HEALTH VISIT: Performed by: PHYSICAL THERAPIST

## 2018-02-05 ENCOUNTER — TRANSFERRED RECORDS (OUTPATIENT)
Dept: HEALTH INFORMATION MANAGEMENT | Facility: CLINIC | Age: 33
End: 2018-02-05

## 2018-02-08 ENCOUNTER — MEDICAL CORRESPONDENCE (OUTPATIENT)
Dept: HEALTH INFORMATION MANAGEMENT | Facility: CLINIC | Age: 33
End: 2018-02-08

## 2018-02-09 ENCOUNTER — RADIANT APPOINTMENT (OUTPATIENT)
Dept: GENERAL RADIOLOGY | Facility: CLINIC | Age: 33
End: 2018-02-09
Attending: CHIROPRACTOR
Payer: COMMERCIAL

## 2018-02-09 DIAGNOSIS — M99.01 CERVICAL SEGMENT DYSFUNCTION: ICD-10-CM

## 2018-02-09 DIAGNOSIS — M99.12 VERTEBRAL SUBLUXATION COMPLEX OF THORACIC REGION: ICD-10-CM

## 2018-02-09 DIAGNOSIS — M99.04 SOMATIC DYSFUNCTION OF SACRAL REGION: ICD-10-CM

## 2018-02-09 DIAGNOSIS — M99.13 VERTEBRAL SUBLUXATION COMPLEX OF LUMBAR REGION: ICD-10-CM

## 2018-02-09 PROCEDURE — 72100 X-RAY EXAM L-S SPINE 2/3 VWS: CPT | Mod: FY

## 2018-02-09 PROCEDURE — 72040 X-RAY EXAM NECK SPINE 2-3 VW: CPT | Mod: FY

## 2018-02-09 PROCEDURE — 72070 X-RAY EXAM THORAC SPINE 2VWS: CPT | Mod: FY

## 2018-02-12 ENCOUNTER — MYC MEDICAL ADVICE (OUTPATIENT)
Dept: FAMILY MEDICINE | Facility: CLINIC | Age: 33
End: 2018-02-12

## 2018-02-14 ENCOUNTER — HOSPITAL ENCOUNTER (OUTPATIENT)
Dept: PHYSICAL THERAPY | Facility: CLINIC | Age: 33
Setting detail: THERAPIES SERIES
End: 2018-02-14
Attending: OBSTETRICS & GYNECOLOGY
Payer: COMMERCIAL

## 2018-02-14 PROCEDURE — 40000841 ZZH STATISTIC WOMEN'S HEALTH VISIT: Performed by: PHYSICAL THERAPIST

## 2018-02-14 PROCEDURE — 97140 MANUAL THERAPY 1/> REGIONS: CPT | Mod: GP | Performed by: PHYSICAL THERAPIST

## 2018-02-14 PROCEDURE — 97110 THERAPEUTIC EXERCISES: CPT | Mod: GP | Performed by: PHYSICAL THERAPIST

## 2018-02-15 ENCOUNTER — MYC MEDICAL ADVICE (OUTPATIENT)
Dept: FAMILY MEDICINE | Facility: CLINIC | Age: 33
End: 2018-02-15

## 2018-02-15 DIAGNOSIS — Z30.2 ENCOUNTER FOR STERILIZATION: Primary | ICD-10-CM

## 2018-02-21 ENCOUNTER — HOSPITAL ENCOUNTER (OUTPATIENT)
Dept: PHYSICAL THERAPY | Facility: CLINIC | Age: 33
Setting detail: THERAPIES SERIES
End: 2018-02-21
Attending: OBSTETRICS & GYNECOLOGY
Payer: COMMERCIAL

## 2018-02-21 PROCEDURE — 40000841 ZZH STATISTIC WOMEN'S HEALTH VISIT: Performed by: PHYSICAL THERAPIST

## 2018-02-21 PROCEDURE — 97140 MANUAL THERAPY 1/> REGIONS: CPT | Mod: GP | Performed by: PHYSICAL THERAPIST

## 2018-02-21 PROCEDURE — 97110 THERAPEUTIC EXERCISES: CPT | Mod: GP | Performed by: PHYSICAL THERAPIST

## 2018-03-02 ENCOUNTER — OFFICE VISIT (OUTPATIENT)
Dept: OBGYN | Facility: CLINIC | Age: 33
End: 2018-03-02
Payer: COMMERCIAL

## 2018-03-02 VITALS
WEIGHT: 129 LBS | DIASTOLIC BLOOD PRESSURE: 69 MMHG | TEMPERATURE: 97.6 F | HEART RATE: 75 BPM | SYSTOLIC BLOOD PRESSURE: 106 MMHG | BODY MASS INDEX: 21.47 KG/M2

## 2018-03-02 DIAGNOSIS — Z30.09 STERILIZATION CONSULT: Primary | ICD-10-CM

## 2018-03-02 PROCEDURE — 99214 OFFICE O/P EST MOD 30 MIN: CPT | Performed by: OBSTETRICS & GYNECOLOGY

## 2018-03-02 NOTE — MR AVS SNAPSHOT
After Visit Summary   3/2/2018    Britney Marquez    MRN: [id number]           Patient Information     Date Of Birth          1985        Visit Information        Provider Department      3/2/2018 8:30 AM Kina Lujan MD [facility]        Today's Diagnoses     Sterilization consult    -  1       Follow-ups after your visit        Your next 10 appointments already scheduled     Mar 14, 2018  9:30 AM CDT   Women's Health Treatment with Solange Montano PT   [facility] Physical [facility] ([facility])    [address] [place] [id number] [phone]            Apr 02, 2018   Procedure with Kina Lujan MD   [facility] PeriOP Services (--)    [address] [id number] [phone]           The medical center is located at [address]. (between [address] in [place], [place] miles north of [place]).              Who to contact     If you have questions or need follow up information about today's clinic visit or your schedule please contact [facility] directly at [phone].  Normal or non-critical lab and imaging results will be communicated to you by MyChart, letter or phone within 4 business days after the clinic has received the results. If you do not hear from us within 7 days, please contact the clinic through [facility]el[employer] or phone. If you have a critical or abnormal lab result, we will notify you by phone as soon as possible.  Submit refill requests through [employer] or call your pharmacy and they will forward the refill request to us. Please allow 3 business days for your refill to be completed.          Additional Information About Your Visit        MyChart Information     [employer] gives you secure access to your electronic health record. If you see a primary care provider, you can also send messages to your care team and make appointments. If you have questions,  please call your primary care clinic.  If you do not have a primary care provider, please call [phone] and they will assist you.        Care EveryWhere ID     This is your Care EveryWhere ID. This could be used by other organizations to access your [place] medical records  [id number]        Your Vitals Were     Pulse Temperature Last Period Breastfeeding? BMI (Body Mass Index)       75 97.6  F (36.4  C) (Tympanic) (LMP Unknown) No 21.47 kg/m2        Blood Pressure from Last 3 Encounters:   03/02/18 106/69   12/22/17 128/83   12/11/17 132/80    Weight from Last 3 Encounters:   03/02/18 129 lb (58.5 kg)   12/22/17 145 lb (65.8 kg)   12/11/17 152 lb (68.9 kg)              Today, you had the following     No orders found for display       Primary Care Provider Office Phone # Fax #    Ivory Hicks [phone]       [address] [place] [id number]        Equal Access to Services     KIRSTEN GUTIÉRREZ : Hadii aad ku hadasho Soomaali, waaxda luqadaha, qaybta kaalmada adeegyada, waxay idiin haycatrachon radha schmid . So [facility] [phone].    ATENCIÓN: Si habla español, tiene a castro disposición servicios gratuitos de asistencia lingüística. Llame al [phone].    We comply with applicable federal civil rights laws and [place] laws. We do not discriminate on the basis of race, color, national origin, age, disability, sex, sexual orientation, or gender identity.            Thank you!     Thank you for choosing [facility]  for your care. Our goal is always to provide you with excellent care. Hearing back from our patients is one way we can continue to improve our services. Please take a few minutes to complete the written survey that you may receive in the mail after your visit with us. Thank you!             Your Updated Medication List - Protect others around you: Learn how to safely use, store and throw away your medicines at www.disposemymeds.org.          This list is accurate  as of 3/2/18 12:14 PM.  Always use your most recent med list.                   Brand Name Dispense Instructions for use Diagnosis    amitriptyline 10 MG tablet    ELAVIL    90 tablet    1 po QHS    PFD (pelvic floor dysfunction)       azelastine 0.05 % Soln ophthalmic solution    OPTIVAR    1 Bottle    Apply 1 drop to eye 2 times daily    Chronic allergic conjunctivitis       diazepam 5 MG tablet    VALIUM    180 tablet    1 tab vaginally twice a day    PFD (pelvic floor dysfunction)       doxycycline 100 MG capsule    VIBRAMYCIN    60 capsule    Take 1 capsule (100 mg) by mouth 2 times daily    Perioral dermatitis       levocetirizine 5 MG tablet    XYZAL    30 tablet    TAKE ONE TABLET BY MOUTH EVERY EVENING    Allergic rhinitis       BEATRIZ-28 0.15-30 MG-MCG per tablet   Generic drug:  levonorgestrel-ethinyl estradiol     112 tablet    TAKE 1 TABLET BY MOUTH EVERY DAY. TAKE  ACTIVE PILLS BY MOUTH  FOR 12 WEEKS (3 MONTHS), THEN PLACEBO TABLET FOR 1 WEEK, THEN RESTART    Surveillance for birth control, oral contraceptives

## 2018-03-02 NOTE — PROGRESS NOTES
Consult for sterilization    Britney Marquez is a 32 year old P1 ( x 1) who desires permanent sterilization.   She does not want to take OCPs anymore. This is the only form of birth control that she has been on. Part of her motivation for not electing to continue birth control use is her struggle with pelvic floor dysfunction and other concerns including affects OCPs have on her GI tract.     She denies other side effects of OCPs such as headaches, breakthrough bleeding, bloating, breast tenderness, etc.     Denies dysuria, hematuria, urgency, incontinence.   Denies constipation, blood in stools, diarrhea.    Obstetric History       T1      L1     SAB0   TAB0   Ectopic0   Multiple0   Live Births0       # Outcome Date GA Lbr Nestor/2nd Weight Sex Delivery Anes PTL Lv   1 Term                  Past Medical History:   Diagnosis Date     Mental disorders of mother, postpartum      Papanicolaou smear of cervix with atypical squamous cells of undetermined significance (ASC-US) 2001     S/P LEEP of cervix 2002    ECC revealing focal ALLEY-3- repeat pap in 2002 was normal (pt was less than 20 yrs of age)      Past Surgical History:   Procedure Laterality Date     C  DELIVERY ONLY  3/2/2003    Low transverse  section -Breech-     SURGICAL HISTORY OF -       LEEP ECC Showed HPV effect     SURGICAL HISTORY OF -       EEC ALLEY-III  pregnacy repeat pap      Social History     Social History     Marital status: Single     Spouse name: N/A     Number of children: N/A     Years of education: N/A     Occupational History     Not on file.     Social History Main Topics     Smoking status: Current Every Day Smoker     Packs/day: 0.25     Years: 17.00     Types: Cigarettes     Smokeless tobacco: Never Used     Alcohol use Yes     Drug use: No     Sexual activity: Yes     Partners: Male     Birth control/ protection: Pill     Other Topics Concern     Parent/Sibling W/ Cabg, Mi Or  Angioplasty Before 65f 55m? No     Social History Narrative        Family History   Problem Relation Age of Onset     Allergies Mother      Respiratory Mother      asthma     Hypertension Father        ROS: 10 point review of systems negative except for pertinent positives stated in the HPI    Exam:   /69 (BP Location: Right arm, Patient Position: Chair, Cuff Size: Adult Regular)  Pulse 75  Temp 97.6  F (36.4  C) (Tympanic)  Wt 129 lb (58.5 kg)  LMP  (LMP Unknown)  Breastfeeding? No  BMI 21.47 kg/m2  General Appearance: Well nourished, well developed female, NAD, AOx3  Gait: Normal  Skin: Normal skin turgor  HEENT: Atraumatic, normocephalic, EOMI  Lungs: Good respiratory effort  Abdomen: Soft, NT, ND, no masses  Pelvic: Deferred  Extremities: No clubbing, no cyanosis and no edema    Assessment: 32 year old P1 desiring permanent sterilization.   -- discussed methods of sterilization including laparoscopic vs hysteroscopic approach with associated risks, benefits, expectations  -- patient elects for laparoscopic approach; as such, propose laparoscopic bilateral salpingectomy; patient is agreeable to laparotomy if necessary  -- risks, benefits, and alternatives of the procedure were discussed with the patient. Risks include but are not limited to: bleeding, infection, damage to other organs, and the need for future surgeries. The irreversible nature of the surgery as well as the risk of regret were also addressed. The patient was also counseled regarding the small risk of failure, and the increased risk of ectopic gestation if pregnancy were to occur. Alternate methods of contraception were also reviewed, and the patient elected to proceed with surgery  -- federal tubal papers were signed today as she conveys she is on medical assistance    Kina Lujan MD  [facility]

## 2018-03-02 NOTE — LETTER
SURGERYPLANNING/SCHEDULING WORKSHEET                              [facility]  [address] [id number] [phone]                          Britney Marquez                :  1985  MRN:  [id number]  Phone: [phone] (home)    Same Day Surgery [phone]   Surgeon: Kina Lujan MD  Diagnosis:   Desires sterilization  Allergies:  Review of patient's allergies indicates no known allergies.   A preoperative evaluation by the primary care provider has been requested to summarize and modify, where possible, medical risks to the proposed surgery.  Specific risks requiring evaluation include   Patient Active Problem List    Diagnosis     PFD (pelvic floor dysfunction)     S/P LEEP of cervix     CARDIOVASCULAR SCREENING; LDL GOAL LESS THAN 160     Mild recurrent major depression (H)     GERD (gastroesophageal reflux disease)     Wrist injury     ====================================================  Surgical Procedure:  OB-GYN: Laparoscopic bilateral tubal ligation  Length of Procedure:  1 hour  Type of anesthesia:  General  The proposed surgical procedure is considered INTERMEDIATE risk.  Date of Procedure:__0-0-94____    Time: __9:00am___________________       Informed Consent Obtained and Signed:  NO  ====================================================  Instructions to Same Day Surgery Staff  Pneumoboots  Special Equipment: Ligassure  Preop Antibiotic:  Ancef 2 gm IV  pre-op within one hour prior to incision For > 80 kg  Preop Pain Meds:  None  PreOp Orders:  Routine Standing Orders.  ====================================================  Instructions to the patient:  Preop physical exam scheduled (within 30 days or 7 days prior) with:  Dr. ___FP/PCP_________________  Clinic:  ____________________                                         Date______________Time_________________________    Come to the  hospitals at: ______8:00am__________________________    HOME PREPARATION:   Shower with Hibiclens the night before and the morning of surgery, gently cleaning skin from neck to feet  Bathe and brush teeth the morning of surgery.  Take medications with a sip of water the morning of surgery:   Check with  if taking insulin.  May have  a light meal, toast and clear liquids, up to 8 hrs before surgery  May have clear liquids (liquids one can read through) up to 4 hrs before surgery  NOTHING after 4 hrs before surgery  Stop aspirin 7-10 days before surgery  Stop NSAIDS (Ibuproven, Naproxen, etc) 5 days before surgery  Stop Plavix 7-10 days before surgery      Kina Lujan MD    3/2/2018

## 2018-03-02 NOTE — NURSING NOTE
"Initial /69 (BP Location: Right arm, Patient Position: Chair, Cuff Size: Adult Regular)  Pulse 75  Temp 97.6  F (36.4  C) (Tympanic)  Wt 129 lb (58.5 kg)  LMP  (LMP Unknown)  Breastfeeding? No  BMI 21.47 kg/m2 Estimated body mass index is 21.47 kg/(m^2) as calculated from the following:    Height as of 12/22/17: 5' 5\" (1.651 m).    Weight as of this encounter: 129 lb (58.5 kg). .    Yani Delarosa  "

## 2018-03-15 ENCOUNTER — OFFICE VISIT (OUTPATIENT)
Dept: FAMILY MEDICINE | Facility: CLINIC | Age: 33
End: 2018-03-15
Payer: COMMERCIAL

## 2018-03-15 VITALS
BODY MASS INDEX: 20.63 KG/M2 | RESPIRATION RATE: 16 BRPM | TEMPERATURE: 97.8 F | OXYGEN SATURATION: 100 % | DIASTOLIC BLOOD PRESSURE: 84 MMHG | SYSTOLIC BLOOD PRESSURE: 124 MMHG | HEART RATE: 117 BPM | WEIGHT: 124 LBS

## 2018-03-15 DIAGNOSIS — Z20.2 POSSIBLE EXPOSURE TO STD: ICD-10-CM

## 2018-03-15 DIAGNOSIS — B37.0 CANDIDIASIS OF MOUTH: ICD-10-CM

## 2018-03-15 DIAGNOSIS — N76.0 BACTERIAL VAGINAL INFECTION: ICD-10-CM

## 2018-03-15 DIAGNOSIS — B96.89 BACTERIAL VAGINAL INFECTION: ICD-10-CM

## 2018-03-15 DIAGNOSIS — Z98.890 S/P LEEP OF CERVIX: ICD-10-CM

## 2018-03-15 DIAGNOSIS — Z01.818 PREOP GENERAL PHYSICAL EXAM: Primary | ICD-10-CM

## 2018-03-15 DIAGNOSIS — Z30.2 ENCOUNTER FOR STERILIZATION: ICD-10-CM

## 2018-03-15 DIAGNOSIS — Z11.51 SCREENING FOR HUMAN PAPILLOMAVIRUS: ICD-10-CM

## 2018-03-15 LAB
BETA HCG QUAL IFA URINE: NEGATIVE
ERYTHROCYTE [DISTWIDTH] IN BLOOD BY AUTOMATED COUNT: 13 % (ref 10–15)
HCT VFR BLD AUTO: 40 % (ref 35–47)
HGB BLD-MCNC: 13.4 G/DL (ref 11.7–15.7)
MCH RBC QN AUTO: 31.2 PG (ref 26.5–33)
MCHC RBC AUTO-ENTMCNC: 33.5 G/DL (ref 31.5–36.5)
MCV RBC AUTO: 93 FL (ref 78–100)
PLATELET # BLD AUTO: 330 10E9/L (ref 150–450)
RBC # BLD AUTO: 4.3 10E12/L (ref 3.8–5.2)
SPECIMEN SOURCE: ABNORMAL
WBC # BLD AUTO: 7.6 10E9/L (ref 4–11)
WET PREP SPEC: ABNORMAL

## 2018-03-15 PROCEDURE — G0476 HPV COMBO ASSAY CA SCREEN: HCPCS | Performed by: FAMILY MEDICINE

## 2018-03-15 PROCEDURE — 87624 HPV HI-RISK TYP POOLED RSLT: CPT | Performed by: FAMILY MEDICINE

## 2018-03-15 PROCEDURE — 87491 CHLMYD TRACH DNA AMP PROBE: CPT | Performed by: FAMILY MEDICINE

## 2018-03-15 PROCEDURE — G0145 SCR C/V CYTO,THINLAYER,RESCR: HCPCS | Performed by: FAMILY MEDICINE

## 2018-03-15 PROCEDURE — 85027 COMPLETE CBC AUTOMATED: CPT | Performed by: FAMILY MEDICINE

## 2018-03-15 PROCEDURE — 87591 N.GONORRHOEAE DNA AMP PROB: CPT | Performed by: FAMILY MEDICINE

## 2018-03-15 PROCEDURE — 99214 OFFICE O/P EST MOD 30 MIN: CPT | Performed by: FAMILY MEDICINE

## 2018-03-15 PROCEDURE — 36415 COLL VENOUS BLD VENIPUNCTURE: CPT | Performed by: FAMILY MEDICINE

## 2018-03-15 PROCEDURE — 84703 CHORIONIC GONADOTROPIN ASSAY: CPT | Performed by: FAMILY MEDICINE

## 2018-03-15 PROCEDURE — 87210 SMEAR WET MOUNT SALINE/INK: CPT | Performed by: FAMILY MEDICINE

## 2018-03-15 RX ORDER — FLUCONAZOLE 150 MG/1
150 TABLET ORAL ONCE
Qty: 1 TABLET | Refills: 0 | Status: SHIPPED | OUTPATIENT
Start: 2018-03-15 | End: 2018-03-15

## 2018-03-15 RX ORDER — METRONIDAZOLE 500 MG/1
500 TABLET ORAL 2 TIMES DAILY
Qty: 14 TABLET | Refills: 0 | Status: SHIPPED | OUTPATIENT
Start: 2018-03-15 | End: 2018-03-27

## 2018-03-15 NOTE — MR AVS SNAPSHOT
After Visit Summary   3/15/2018    Britney Marquez    MRN: [id number]           Patient Information     Date Of Birth          1985        Visit Information        Provider Department      3/15/2018 1:40 PM Ivory Hicks MD [facility]        Today's Diagnoses     Preop general physical exam    -  1    Encounter for sterilization        Possible exposure to STD        S/P LEEP of cervix        Screening for human papillomavirus        Bacterial vaginal infection        Candidiasis of mouth          Care Instructions    Let me know if cough isn't almost gone by next week  Take the metronidazole  Take the yeast med if needed  Before Your Surgery      Call your surgeon if there is any change in your health. This includes signs of a cold or flu (such as a sore throat, runny nose, cough, rash or fever).    Do not smoke, drink alcohol or take over the counter medicine (unless your surgeon or primary care doctor tells you to) for the 24 hours before and after surgery.    If you take prescribed drugs: Follow your doctor s orders about which medicines to take and which to stop until after surgery.    Eating and drinking prior to surgery: follow the instructions from your surgeon    Take a shower or bath the night before surgery. Use the soap your surgeon gave you to gently clean your skin. If you do not have soap from your surgeon, use your regular soap. Do not shave or scrub the surgery site.  Wear clean pajamas and have clean sheets on your bed.           Follow-ups after your visit        Your next 10 appointments already scheduled     Apr 02, 2018   Procedure with Kina Lujan MD   [facility] (--)    [address] [place] [id number] [phone]           The medical center is located at [address]. (between [address] in [place], [place] north of [place]).            Apr 11, 2018  9:30 AM CDT   Women's Health  Treatment with Solange Montano PT   [facility] ([facility])    [address] [place] [address] [phone]              Who to contact     If you have questions or need follow up information about today's clinic visit or your schedule please contact [facility] [place]note 2] directly at [phone].  Normal or non-critical lab and imaging results will be communicated to you by MyChart, letter or phone within 4 business days after the clinic has received the results. If you do not hear from us within 7 days, please contact the clinic through [employer] or phone. If you have a critical or abnormal lab result, we will notify you by phone as soon as possible.  Submit refill requests through [employer] or call your pharmacy and they will forward the refill request to us. Please allow 3 business days for your refill to be completed.          Additional Information About Your Visit        [employer] Information     [employer] gives you secure access to your electronic health record. If you see a primary care provider, you can also send messages to your care team and make appointments. If you have questions, please call your primary care clinic.  If you do not have a primary care provider, please call [phone] and they will assist you.        Care EveryWhere ID     This is your Care EveryWhere ID. This could be used by other organizations to access your [facility] medical records  [id number]        Your Vitals Were     Pulse Temperature Respirations Last Period Pulse Oximetry BMI (Body Mass Index)    117 97.8  F (36.6  C) (Tympanic) 16 02/04/2018 100% 20.63 kg/m2       Blood Pressure from Last 3 Encounters:   03/15/18 124/84   03/02/18 106/69   12/22/17 128/83    Weight from Last 3 Encounters:   03/15/18 124 lb (56.2 kg)   03/02/18 129 lb (58.5 kg)   12/22/17 145 lb (65.8 kg)              We Performed the Following     Beta HCG Qual, Urine - [facility] [place] ([id number])      CBC with platelets     CHLAMYDIA TRACHOMATIS PCR     HPV High Risk Types DNA Cervical     NEISSERIA GONORRHOEA PCR     Pap imaged thin layer screen with HPV - recommended age 30 - 65 years (select HPV order below)     Wet prep          Today's Medication Changes          These changes are accurate as of 3/15/18  2:46 PM.  If you have any questions, ask your nurse or doctor.               Start taking these medicines.        Dose/Directions    fluconazole 150 MG tablet   Commonly known as:  DIFLUCAN   Used for:  Bacterial vaginal infection, Candidiasis of mouth   Started by:  Ivory Hicks MD        Dose:  150 mg   Take 1 tablet (150 mg) by mouth once for 1 dose   Quantity:  1 tablet   Refills:  0       metroNIDAZOLE 500 MG tablet   Commonly known as:  FLAGYL   Used for:  Bacterial vaginal infection   Started by:  Ivory Hicks MD        Dose:  500 mg   Take 1 tablet (500 mg) by mouth 2 times daily   Quantity:  14 tablet   Refills:  0            Where to get your medicines      These medications were sent to [facility] [address] [address]     Phone:  [phone]     fluconazole 150 MG tablet    metroNIDAZOLE 500 MG tablet                Primary Care Provider Office Phone # Fax #    Ivory Hicks [phone]       [address] [place] [address]        Equal Access to Services     KIRSTEN GUTIÉRREZ AH: Amsiha valdez hadasho Soomaali, waaxda luqadaha, qaybta kaalmada adeegyada, robin traylor. So [facility] [phone].    ATENCIÓN: Si habla español, tiene a castro disposición servicios gratuitos de asistencia lingüística. Llame al [phone].    We comply with applicable federal civil rights laws and [place] laws. We do not discriminate on the basis of race, color, national origin, age, disability, sex, sexual orientation, or gender identity.            Thank you!     Thank you for choosing [facility]  for your care. Our goal is always to provide you with excellent care. Hearing back from our patients is one way we can continue to improve our services. Please take a few minutes to complete the written survey that you may receive in the mail after your visit with us. Thank you!             Your Updated Medication List - Protect others around you: Learn how to safely use, store and throw away your medicines at www.disposemymeds.org.          This list is accurate as of 3/15/18  2:46 PM.  Always use your most recent med list.                   Brand Name Dispense Instructions for use Diagnosis    amitriptyline 10 MG tablet    ELAVIL    90 tablet    1 po QHS    PFD (pelvic floor dysfunction)       azelastine 0.05 % Soln ophthalmic solution    OPTIVAR    1 Bottle    Apply 1 drop to eye 2 times daily    Chronic allergic conjunctivitis       diazepam 5 MG tablet    VALIUM    180 tablet    1 tab vaginally twice a day    PFD (pelvic floor dysfunction)       doxycycline 100 MG capsule    VIBRAMYCIN    60 capsule    Take 1 capsule (100 mg) by mouth 2 times daily    Perioral dermatitis       fluconazole 150 MG tablet    DIFLUCAN    1 tablet    Take 1 tablet (150 mg) by mouth once for 1 dose    Bacterial vaginal infection, Candidiasis of mouth       levocetirizine 5 MG tablet    XYZAL    30 tablet    TAKE ONE TABLET BY MOUTH EVERY EVENING    Allergic rhinitis       metroNIDAZOLE 500 MG tablet    FLAGYL    14 tablet    Take 1 tablet (500 mg) by mouth 2 times daily    Bacterial vaginal infection       BEATRIZ-28 0.15-30 MG-MCG per tablet   Generic drug:  levonorgestrel-ethinyl estradiol     112 tablet    TAKE 1 TABLET BY MOUTH EVERY DAY. TAKE  ACTIVE PILLS BY MOUTH  FOR 12 WEEKS (3 MONTHS), THEN PLACEBO TABLET FOR 1 WEEK, THEN RESTART    Surveillance for birth control, oral contraceptives

## 2018-03-15 NOTE — NURSING NOTE
"Chief Complaint   Patient presents with     Pre-Op Exam     tubal ligation     STD     wants testing       Initial /84 (BP Location: Left arm)  Pulse 117  Temp 97.8  F (36.6  C) (Tympanic)  Resp 16  Wt 124 lb (56.2 kg)  LMP 02/04/2018  SpO2 100%  BMI 20.63 kg/m2 Estimated body mass index is 20.63 kg/(m^2) as calculated from the following:    Height as of 12/22/17: 5' 5\" (1.651 m).    Weight as of this encounter: 124 lb (56.2 kg).      Health Maintenance that is potentially due pending provider review:  NONE    n/a    Is there anyone who you would like to be able to receive your results? No  If yes have patient fill out PENNY    "

## 2018-03-15 NOTE — PATIENT INSTRUCTIONS
Let me know if cough isn't almost gone by next week  Take the metronidazole  Take the yeast med if needed  Before Your Surgery      Call your surgeon if there is any change in your health. This includes signs of a cold or flu (such as a sore throat, runny nose, cough, rash or fever).    Do not smoke, drink alcohol or take over the counter medicine (unless your surgeon or primary care doctor tells you to) for the 24 hours before and after surgery.    If you take prescribed drugs: Follow your doctor s orders about which medicines to take and which to stop until after surgery.    Eating and drinking prior to surgery: follow the instructions from your surgeon    Take a shower or bath the night before surgery. Use the soap your surgeon gave you to gently clean your skin. If you do not have soap from your surgeon, use your regular soap. Do not shave or scrub the surgery site.  Wear clean pajamas and have clean sheets on your bed.

## 2018-03-15 NOTE — PROGRESS NOTES
[facility]  [address] [facility] [address]  [phone]  Dept: [phone]    PRE-OP EVALUATION:  Today's date: 3/15/2018    Britney Marquez (: 1985) presents for pre-operative evaluation assessment as requested by Dr. Lujan.  She requires evaluation and anesthesia risk assessment prior to undergoing surgery/procedure for treatment of tubal ligation .    Proposed Surgery/ Procedure: tubal ligation  Date of Surgery/ Procedure: 2018  Time of Surgery/ Procedure:   Hospital/Surgical Facility: [facility]    Primary Physician: Ivory Hicks  Type of Anesthesia Anticipated: General    Patient has a Health Care Directive or Living Will:  NO    1. NO - Do you have a history of heart attack, stroke, stent, bypass or surgery on an artery in the head, neck, heart or legs?  2. NO - Do you ever have any pain or discomfort in your chest?  3. NO - Do you have a history of  Heart Failure?  4. NO - Are you troubled by shortness of breath when: walking on the level, up a slight hill or at night?  5. YES - Do you currently have a cold, bronchitis or other respiratory infection?  Resolving bronchitis  6. YES- Do you have a cough, shortness of breath or wheezing? Resolving bronchitis  7. NO - Do you sometimes get pains in the calves of your legs when you walk?  8. NO - Do you or anyone in your family have previous history of blood clots?  9. NO - Do you or does anyone in your family have a serious bleeding problem such as prolonged bleeding following surgeries or cuts?  10. NO - Have you ever had problems with anemia or been told to take iron pills?  11. NO - Have you had any abnormal blood loss such as black, tarry or bloody stools, or abnormal vaginal bleeding?  12. YES - Have you ever had a blood transfusion?  13. NO - Have you or any of your relatives ever had problems with anesthesia?  14. NO - Do you have sleep apnea, excessive snoring or daytime drowsiness?  15. NO - Do you have any  prosthetic heart valves?  16. NO - Do you have prosthetic joints?  17. NO - Is there any chance that you may be pregnant?      HPI:     HPI related to upcoming procedure: desires sterilization    2 weeks of copious vaginal clear discharge, burns, a little white today.   She wants to be checked for STDs as well, as her partner has been unfaithful    Lab Results   Component Value Date    PAP NIL 10/07/2016    PAP NIL 2015    PAP NIL 10/29/2013      10-14 days of nasal congestion, cough, is getting better.   Is still smoking    Colitis-she has been taking over the counter pancreatic enzymes and is much improved, has been able to stop imodium. Has formed stools.     See problem list for active medical problems.  Problems all longstanding and stable, except as noted/documented.  See ROS for pertinent symptoms related to these conditions.                                                                                                  .    MEDICAL HISTORY:     Patient Active Problem List    Diagnosis Date Noted     PFD (pelvic floor dysfunction) 2017     Priority: Medium     S/P LEEP of cervix 2013     Priority: Medium     2002:ECC revealing focal ALLEY-3       CARDIOVASCULAR SCREENING; LDL GOAL LESS THAN 160 10/31/2010     Priority: Medium     Mild recurrent major depression (H) 10/24/2010     Priority: Medium     GERD (gastroesophageal reflux disease) 2010     Priority: Medium     Wrist injury 2008     Priority: Medium      Past Medical History:   Diagnosis Date     Mental disorders of mother, postpartum      Papanicolaou smear of cervix with atypical squamous cells of undetermined significance (ASC-US) 2001     S/P LEEP of cervix 2002    ECC revealing focal ALLEY-3- repeat pap in 2002 was normal (pt was less than 20 yrs of age)     Past Surgical History:   Procedure Laterality Date     C  DELIVERY ONLY  3/2/2003    Low transverse  section -[facility]-     SURGICAL HISTORY  OF -   5/02    LEEP ECC Showed HPV effect     SURGICAL HISTORY OF -       EEC ALLEY-III  pregnacy repeat pap     Current Outpatient Prescriptions   Medication Sig Dispense Refill     metroNIDAZOLE (FLAGYL) 500 MG tablet Take 1 tablet (500 mg) by mouth 2 times daily 14 tablet 0     fluconazole (DIFLUCAN) 150 MG tablet Take 1 tablet (150 mg) by mouth once for 1 dose 1 tablet 0     BEATRIZ-28 0.15-30 MG-MCG per tablet TAKE 1 TABLET BY MOUTH EVERY DAY. TAKE  ACTIVE PILLS BY MOUTH  FOR 12 WEEKS (3 MONTHS), THEN PLACEBO TABLET FOR 1 WEEK, THEN RESTART 112 tablet 1     diazepam (VALIUM) 5 MG tablet 1 tab vaginally twice a day 180 tablet 1     amitriptyline (ELAVIL) 10 MG tablet 1 po QHS 90 tablet 3     doxycycline (VIBRAMYCIN) 100 MG capsule Take 1 capsule (100 mg) by mouth 2 times daily 60 capsule 1     levocetirizine (XYZAL) 5 MG tablet TAKE ONE TABLET BY MOUTH EVERY EVENING 30 tablet 1     azelastine (OPTIVAR) 0.05 % SOLN Apply 1 drop to eye 2 times daily 1 Bottle 2     OTC products: None, except as noted above    No Known Allergies   Latex Allergy: NO    Social History   Substance Use Topics     Smoking status: Current Every Day Smoker     Packs/day: 0.25     Years: 17.00     Types: Cigarettes     Smokeless tobacco: Never Used     Alcohol use Yes     History   Drug Use No       REVIEW OF SYSTEMS:   CONSTITUTIONAL: NEGATIVE for fever, chills, change in weight  ENT/MOUTH: NEGATIVE for ear, mouth and throat problems  RESP: positive for cough as above  CV: NEGATIVE for chest pain, palpitations or peripheral edema  : see above, No dysuria, urinary frequency or urgency.     EXAM:   /84 (BP Location: Left arm)  Pulse 117  Temp 97.8  F (36.6  C) (Tympanic)  Resp 16  Wt 124 lb (56.2 kg)  LMP 02/04/2018  SpO2 100%  BMI 20.63 kg/m2  GENERAL APPEARANCE: healthy, alert and no distress  HENT: ear canals and TM's normal and nose and mouth without ulcers or lesions  RESP: lungs with few inspiratory rhonchi, no crackles, no  wheezes  CV: regular rate and rhythm, normal S1 S2, no S3 or S4 and no murmur, click or rub   ABDOMEN: soft, nontender, no HSM or masses and bowel sounds normal   (female): see above  NEURO: Normal strength and tone, sensory exam grossly normal, mentation intact and speech normal    DIAGNOSTICS:     Results for orders placed or performed in visit on 03/15/18   CBC with platelets   Result Value Ref Range    WBC 7.6 4.0 - 11.0 10e9/L    RBC Count 4.30 3.8 - 5.2 10e12/L    Hemoglobin 13.4 11.7 - 15.7 g/dL    Hematocrit 40.0 35.0 - 47.0 %    MCV 93 78 - 100 fl    MCH 31.2 26.5 - 33.0 pg    MCHC 33.5 31.5 - 36.5 g/dL    RDW 13.0 10.0 - 15.0 %    Platelet Count 330 150 - 450 10e9/L   Beta HCG Qual, Urine - [facility] [place] ([id number])   Result Value Ref Range    Beta HCG Qual IFA Urine Negative NEG^Negative      Wet prep   Result Value Ref Range    Specimen Description Vagina     Wet Prep No yeast seen     Wet Prep No Trichomonas seen     Wet Prep Clue cells seen (A)         IMPRESSION:   Reason for surgery/procedure: desires sterilization    The proposed surgical procedure is considered INTERMEDIATE risk.    REVISED CARDIAC RISK INDEX  The patient has the following serious cardiovascular risks for perioperative complications such as (MI, PE, VFib and 3  AV Block):  No serious cardiac risks  INTERPRETATION: 0 risks: Class I (very low risk - 0.4% complication rate)    The patient has the following additional risks for perioperative complications:  No identified additional risks      ICD-10-CM    1. Preop general physical exam Z01.818    2. Encounter for sterilization Z30.2 CBC with platelets     Beta HCG Qual, Urine - [facility] [place] ([id number])   3. Possible exposure to STD Z20.2 NEISSERIA GONORRHOEA PCR     CHLAMYDIA TRACHOMATIS PCR     Wet prep   4. S/P LEEP of cervix Z98.890    5. Screening for human papillomavirus Z11.51 Pap imaged thin layer screen with HPV - recommended age 30 - 65 years (select HPV order  below)     HPV High Risk Types DNA Cervical   6. Bacterial vaginal infection N76.0 metroNIDAZOLE (FLAGYL) 500 MG tablet    B96.89 fluconazole (DIFLUCAN) 150 MG tablet   7. Candidiasis of mouth B37.0 fluconazole (DIFLUCAN) 150 MG tablet       RECOMMENDATIONS:         --Patient is to take all scheduled medications on the day of surgery EXCEPT for modifications listed below.    APPROVAL GIVEN to proceed with proposed procedure, without further diagnostic evaluation       Signed Electronically by: Ivory Hicks MD    Copy of this evaluation report is provided to requesting physician.    [facility] Preop Guidelines

## 2018-03-16 LAB
C TRACH DNA SPEC QL NAA+PROBE: NEGATIVE
N GONORRHOEA DNA SPEC QL NAA+PROBE: NEGATIVE
SPECIMEN SOURCE: NORMAL
SPECIMEN SOURCE: NORMAL

## 2018-03-19 LAB
COPATH REPORT: NORMAL
PAP: NORMAL

## 2018-03-20 ENCOUNTER — MYC MEDICAL ADVICE (OUTPATIENT)
Dept: FAMILY MEDICINE | Facility: CLINIC | Age: 33
End: 2018-03-20

## 2018-03-20 DIAGNOSIS — J20.9 ACUTE BRONCHITIS WITH SYMPTOMS > 10 DAYS: Primary | ICD-10-CM

## 2018-03-21 LAB
FINAL DIAGNOSIS: NORMAL
HPV HR 12 DNA CVX QL NAA+PROBE: NEGATIVE
HPV16 DNA SPEC QL NAA+PROBE: NEGATIVE
HPV18 DNA SPEC QL NAA+PROBE: NEGATIVE
SPECIMEN DESCRIPTION: NORMAL
SPECIMEN SOURCE CVX/VAG CYTO: NORMAL

## 2018-03-21 RX ORDER — AZITHROMYCIN 250 MG/1
TABLET, FILM COATED ORAL
Qty: 6 TABLET | Refills: 0 | Status: SHIPPED | OUTPATIENT
Start: 2018-03-21 | End: 2018-08-09

## 2018-03-30 ENCOUNTER — ANESTHESIA EVENT (OUTPATIENT)
Dept: SURGERY | Facility: CLINIC | Age: 33
End: 2018-03-30
Payer: COMMERCIAL

## 2018-04-02 ENCOUNTER — ANESTHESIA (OUTPATIENT)
Dept: SURGERY | Facility: CLINIC | Age: 33
End: 2018-04-02
Payer: COMMERCIAL

## 2018-04-02 ENCOUNTER — HOSPITAL ENCOUNTER (OUTPATIENT)
Facility: CLINIC | Age: 33
Discharge: HOME | End: 2018-04-02
Attending: OBSTETRICS & GYNECOLOGY | Admitting: OBSTETRICS & GYNECOLOGY
Payer: COMMERCIAL

## 2018-04-02 ENCOUNTER — SURGERY (OUTPATIENT)
Age: 33
End: 2018-04-02

## 2018-04-02 VITALS
RESPIRATION RATE: 16 BRPM | DIASTOLIC BLOOD PRESSURE: 69 MMHG | SYSTOLIC BLOOD PRESSURE: 106 MMHG | TEMPERATURE: 98.5 F | OXYGEN SATURATION: 99 % | HEART RATE: 61 BPM

## 2018-04-02 DIAGNOSIS — Z90.79 STATUS POST BILATERAL SALPINGECTOMY: Primary | ICD-10-CM

## 2018-04-02 LAB
ABO + RH BLD: NORMAL
ABO + RH BLD: NORMAL
B-HCG SERPL-ACNC: <1 IU/L (ref 0–5)
BLD GP AB SCN SERPL QL: NORMAL
BLOOD BANK CMNT PATIENT-IMP: NORMAL
HCG UR QL: NEGATIVE
SPECIMEN EXP DATE BLD: NORMAL

## 2018-04-02 PROCEDURE — 86901 BLOOD TYPING SEROLOGIC RH(D): CPT | Performed by: OBSTETRICS & GYNECOLOGY

## 2018-04-02 PROCEDURE — 88302 TISSUE EXAM BY PATHOLOGIST: CPT | Performed by: OBSTETRICS & GYNECOLOGY

## 2018-04-02 PROCEDURE — 86900 BLOOD TYPING SEROLOGIC ABO: CPT | Performed by: OBSTETRICS & GYNECOLOGY

## 2018-04-02 PROCEDURE — 71000027 ZZH RECOVERY PHASE 2 EACH 15 MINS: Performed by: OBSTETRICS & GYNECOLOGY

## 2018-04-02 PROCEDURE — 58661 LAPAROSCOPY REMOVE ADNEXA: CPT | Mod: AS | Performed by: NURSE PRACTITIONER

## 2018-04-02 PROCEDURE — 71000012 ZZH RECOVERY PHASE 1 LEVEL 1 FIRST HR: Performed by: OBSTETRICS & GYNECOLOGY

## 2018-04-02 PROCEDURE — 81025 URINE PREGNANCY TEST: CPT | Performed by: OBSTETRICS & GYNECOLOGY

## 2018-04-02 PROCEDURE — 36000058 ZZH SURGERY LEVEL 3 EA 15 ADDTL MIN: Performed by: OBSTETRICS & GYNECOLOGY

## 2018-04-02 PROCEDURE — 84702 CHORIONIC GONADOTROPIN TEST: CPT | Performed by: NURSE ANESTHETIST, CERTIFIED REGISTERED

## 2018-04-02 PROCEDURE — 27210794 ZZH OR GENERAL SUPPLY STERILE: Performed by: OBSTETRICS & GYNECOLOGY

## 2018-04-02 PROCEDURE — 36000056 ZZH SURGERY LEVEL 3 1ST 30 MIN: Performed by: OBSTETRICS & GYNECOLOGY

## 2018-04-02 PROCEDURE — 86850 RBC ANTIBODY SCREEN: CPT | Performed by: OBSTETRICS & GYNECOLOGY

## 2018-04-02 PROCEDURE — 25000132 ZZH RX MED GY IP 250 OP 250 PS 637: Performed by: OBSTETRICS & GYNECOLOGY

## 2018-04-02 PROCEDURE — 37000009 ZZH ANESTHESIA TECHNICAL FEE, EACH ADDTL 15 MIN: Performed by: OBSTETRICS & GYNECOLOGY

## 2018-04-02 PROCEDURE — 25000125 ZZHC RX 250: Performed by: NURSE ANESTHETIST, CERTIFIED REGISTERED

## 2018-04-02 PROCEDURE — 37000008 ZZH ANESTHESIA TECHNICAL FEE, 1ST 30 MIN: Performed by: OBSTETRICS & GYNECOLOGY

## 2018-04-02 PROCEDURE — 25000128 H RX IP 250 OP 636: Performed by: NURSE ANESTHETIST, CERTIFIED REGISTERED

## 2018-04-02 PROCEDURE — 58661 LAPAROSCOPY REMOVE ADNEXA: CPT | Performed by: OBSTETRICS & GYNECOLOGY

## 2018-04-02 PROCEDURE — 25000125 ZZHC RX 250: Performed by: OBSTETRICS & GYNECOLOGY

## 2018-04-02 PROCEDURE — 71000013 ZZH RECOVERY PHASE 1 LEVEL 1 EA ADDTL HR: Performed by: OBSTETRICS & GYNECOLOGY

## 2018-04-02 PROCEDURE — 25000566 ZZH SEVOFLURANE, EA 15 MIN: Performed by: OBSTETRICS & GYNECOLOGY

## 2018-04-02 PROCEDURE — 40000305 ZZH STATISTIC PRE PROC ASSESS I: Performed by: OBSTETRICS & GYNECOLOGY

## 2018-04-02 PROCEDURE — 88302 TISSUE EXAM BY PATHOLOGIST: CPT | Mod: 26 | Performed by: OBSTETRICS & GYNECOLOGY

## 2018-04-02 PROCEDURE — 27110028 ZZH OR GENERAL SUPPLY NON-STERILE: Performed by: OBSTETRICS & GYNECOLOGY

## 2018-04-02 PROCEDURE — 36415 COLL VENOUS BLD VENIPUNCTURE: CPT | Performed by: NURSE ANESTHETIST, CERTIFIED REGISTERED

## 2018-04-02 RX ORDER — FENTANYL CITRATE 50 UG/ML
25-50 INJECTION, SOLUTION INTRAMUSCULAR; INTRAVENOUS
Status: DISCONTINUED | OUTPATIENT
Start: 2018-04-02 | End: 2018-04-02 | Stop reason: HOSPADM

## 2018-04-02 RX ORDER — LIDOCAINE 40 MG/G
CREAM TOPICAL
Status: DISCONTINUED | OUTPATIENT
Start: 2018-04-02 | End: 2018-04-02 | Stop reason: HOSPADM

## 2018-04-02 RX ORDER — ONDANSETRON 2 MG/ML
4 INJECTION INTRAMUSCULAR; INTRAVENOUS EVERY 30 MIN PRN
Status: DISCONTINUED | OUTPATIENT
Start: 2018-04-02 | End: 2018-04-02 | Stop reason: HOSPADM

## 2018-04-02 RX ORDER — SODIUM CHLORIDE, SODIUM LACTATE, POTASSIUM CHLORIDE, CALCIUM CHLORIDE 600; 310; 30; 20 MG/100ML; MG/100ML; MG/100ML; MG/100ML
INJECTION, SOLUTION INTRAVENOUS CONTINUOUS
Status: DISCONTINUED | OUTPATIENT
Start: 2018-04-02 | End: 2018-04-02 | Stop reason: HOSPADM

## 2018-04-02 RX ORDER — AMOXICILLIN 250 MG
1-2 CAPSULE ORAL 2 TIMES DAILY
Qty: 60 TABLET | Refills: 0 | Status: SHIPPED | OUTPATIENT
Start: 2018-04-02 | End: 2018-08-09

## 2018-04-02 RX ORDER — MEPERIDINE HYDROCHLORIDE 25 MG/ML
12.5 INJECTION INTRAMUSCULAR; INTRAVENOUS; SUBCUTANEOUS
Status: DISCONTINUED | OUTPATIENT
Start: 2018-04-02 | End: 2018-04-02 | Stop reason: HOSPADM

## 2018-04-02 RX ORDER — KETOROLAC TROMETHAMINE 30 MG/ML
30 INJECTION, SOLUTION INTRAMUSCULAR; INTRAVENOUS EVERY 6 HOURS PRN
Status: DISCONTINUED | OUTPATIENT
Start: 2018-04-02 | End: 2018-04-02 | Stop reason: HOSPADM

## 2018-04-02 RX ORDER — ONDANSETRON 4 MG/1
4 TABLET, ORALLY DISINTEGRATING ORAL
Status: DISCONTINUED | OUTPATIENT
Start: 2018-04-02 | End: 2018-04-02 | Stop reason: HOSPADM

## 2018-04-02 RX ORDER — ONDANSETRON 4 MG/1
4-8 TABLET, ORALLY DISINTEGRATING ORAL EVERY 8 HOURS PRN
Qty: 10 TABLET | Refills: 0 | Status: SHIPPED | OUTPATIENT
Start: 2018-04-02 | End: 2018-08-09

## 2018-04-02 RX ORDER — DEXAMETHASONE SODIUM PHOSPHATE 4 MG/ML
INJECTION, SOLUTION INTRA-ARTICULAR; INTRALESIONAL; INTRAMUSCULAR; INTRAVENOUS; SOFT TISSUE PRN
Status: DISCONTINUED | OUTPATIENT
Start: 2018-04-02 | End: 2018-04-02

## 2018-04-02 RX ORDER — NALOXONE HYDROCHLORIDE 0.4 MG/ML
.1-.4 INJECTION, SOLUTION INTRAMUSCULAR; INTRAVENOUS; SUBCUTANEOUS
Status: DISCONTINUED | OUTPATIENT
Start: 2018-04-02 | End: 2018-04-02 | Stop reason: HOSPADM

## 2018-04-02 RX ORDER — ACETAMINOPHEN 325 MG/1
650 TABLET ORAL EVERY 4 HOURS PRN
Qty: 100 TABLET | Refills: 0 | Status: SHIPPED | OUTPATIENT
Start: 2018-04-02 | End: 2019-06-07

## 2018-04-02 RX ORDER — OXYCODONE HYDROCHLORIDE 5 MG/1
5-10 TABLET ORAL EVERY 6 HOURS PRN
Qty: 30 TABLET | Refills: 0 | Status: SHIPPED | OUTPATIENT
Start: 2018-04-02 | End: 2018-08-09

## 2018-04-02 RX ORDER — ONDANSETRON 2 MG/ML
INJECTION INTRAMUSCULAR; INTRAVENOUS PRN
Status: DISCONTINUED | OUTPATIENT
Start: 2018-04-02 | End: 2018-04-02

## 2018-04-02 RX ORDER — OXYCODONE HYDROCHLORIDE 5 MG/1
5-10 TABLET ORAL
Status: COMPLETED | OUTPATIENT
Start: 2018-04-02 | End: 2018-04-02

## 2018-04-02 RX ORDER — NEOSTIGMINE METHYLSULFATE 1 MG/ML
VIAL (ML) INJECTION PRN
Status: DISCONTINUED | OUTPATIENT
Start: 2018-04-02 | End: 2018-04-02

## 2018-04-02 RX ORDER — LIDOCAINE HYDROCHLORIDE 10 MG/ML
INJECTION, SOLUTION INFILTRATION; PERINEURAL PRN
Status: DISCONTINUED | OUTPATIENT
Start: 2018-04-02 | End: 2018-04-02

## 2018-04-02 RX ORDER — GLYCOPYRROLATE 0.2 MG/ML
INJECTION, SOLUTION INTRAMUSCULAR; INTRAVENOUS PRN
Status: DISCONTINUED | OUTPATIENT
Start: 2018-04-02 | End: 2018-04-02

## 2018-04-02 RX ORDER — IBUPROFEN 200 MG
600 TABLET ORAL
Status: DISCONTINUED | OUTPATIENT
Start: 2018-04-02 | End: 2018-04-02 | Stop reason: HOSPADM

## 2018-04-02 RX ORDER — IBUPROFEN 600 MG/1
600 TABLET, FILM COATED ORAL EVERY 6 HOURS PRN
Qty: 30 TABLET | Refills: 0 | Status: SHIPPED | OUTPATIENT
Start: 2018-04-02 | End: 2018-08-09

## 2018-04-02 RX ORDER — ONDANSETRON 4 MG/1
4 TABLET, ORALLY DISINTEGRATING ORAL EVERY 30 MIN PRN
Status: DISCONTINUED | OUTPATIENT
Start: 2018-04-02 | End: 2018-04-02 | Stop reason: HOSPADM

## 2018-04-02 RX ORDER — HYDROMORPHONE HYDROCHLORIDE 1 MG/ML
.3-.5 INJECTION, SOLUTION INTRAMUSCULAR; INTRAVENOUS; SUBCUTANEOUS EVERY 10 MIN PRN
Status: DISCONTINUED | OUTPATIENT
Start: 2018-04-02 | End: 2018-04-02 | Stop reason: HOSPADM

## 2018-04-02 RX ORDER — FENTANYL CITRATE 50 UG/ML
INJECTION, SOLUTION INTRAMUSCULAR; INTRAVENOUS PRN
Status: DISCONTINUED | OUTPATIENT
Start: 2018-04-02 | End: 2018-04-02

## 2018-04-02 RX ORDER — PROPOFOL 10 MG/ML
INJECTION, EMULSION INTRAVENOUS PRN
Status: DISCONTINUED | OUTPATIENT
Start: 2018-04-02 | End: 2018-04-02

## 2018-04-02 RX ORDER — ALBUTEROL SULFATE 0.83 MG/ML
2.5 SOLUTION RESPIRATORY (INHALATION) EVERY 4 HOURS PRN
Status: DISCONTINUED | OUTPATIENT
Start: 2018-04-02 | End: 2018-04-02 | Stop reason: HOSPADM

## 2018-04-02 RX ORDER — BUPIVACAINE HYDROCHLORIDE 2.5 MG/ML
INJECTION, SOLUTION INFILTRATION; PERINEURAL PRN
Status: DISCONTINUED | OUTPATIENT
Start: 2018-04-02 | End: 2018-04-02 | Stop reason: HOSPADM

## 2018-04-02 RX ADMIN — FENTANYL CITRATE 100 MCG: 50 INJECTION, SOLUTION INTRAMUSCULAR; INTRAVENOUS at 10:16

## 2018-04-02 RX ADMIN — LIDOCAINE HYDROCHLORIDE 0.5 ML: 10 INJECTION, SOLUTION EPIDURAL; INFILTRATION; INTRACAUDAL; PERINEURAL at 08:25

## 2018-04-02 RX ADMIN — MIDAZOLAM 2 MG: 1 INJECTION INTRAMUSCULAR; INTRAVENOUS at 10:16

## 2018-04-02 RX ADMIN — DEXAMETHASONE SODIUM PHOSPHATE 4 MG: 4 INJECTION, SOLUTION INTRA-ARTICULAR; INTRALESIONAL; INTRAMUSCULAR; INTRAVENOUS; SOFT TISSUE at 10:32

## 2018-04-02 RX ADMIN — OXYCODONE HYDROCHLORIDE 5 MG: 5 TABLET ORAL at 12:05

## 2018-04-02 RX ADMIN — GLYCOPYRROLATE 1 MG: 0.2 INJECTION, SOLUTION INTRAMUSCULAR; INTRAVENOUS at 11:10

## 2018-04-02 RX ADMIN — ONDANSETRON 4 MG: 2 INJECTION INTRAMUSCULAR; INTRAVENOUS at 10:32

## 2018-04-02 RX ADMIN — SODIUM CHLORIDE, POTASSIUM CHLORIDE, SODIUM LACTATE AND CALCIUM CHLORIDE: 600; 310; 30; 20 INJECTION, SOLUTION INTRAVENOUS at 08:25

## 2018-04-02 RX ADMIN — LIDOCAINE HYDROCHLORIDE 50 MG: 10 INJECTION, SOLUTION INFILTRATION; PERINEURAL at 10:19

## 2018-04-02 RX ADMIN — FENTANYL CITRATE 150 MCG: 50 INJECTION, SOLUTION INTRAMUSCULAR; INTRAVENOUS at 10:19

## 2018-04-02 RX ADMIN — Medication 5 MG: at 11:10

## 2018-04-02 RX ADMIN — BUPIVACAINE HYDROCHLORIDE 50 ML: 2.5 INJECTION, SOLUTION EPIDURAL; INFILTRATION; INTRACAUDAL; PERINEURAL at 11:03

## 2018-04-02 RX ADMIN — ROCURONIUM BROMIDE 20 MG: 10 INJECTION INTRAVENOUS at 10:19

## 2018-04-02 RX ADMIN — PROPOFOL 160 MG: 10 INJECTION, EMULSION INTRAVENOUS at 10:19

## 2018-04-02 ASSESSMENT — LIFESTYLE VARIABLES: TOBACCO_USE: 1

## 2018-04-02 NOTE — ANESTHESIA CARE TRANSFER NOTE
Patient: Britney Marquez    Procedure(s):  Laparoscopic bilateral tubal ligation - Wound Class: II-Clean Contaminated    Diagnosis: Desires sterilizaton  Diagnosis Additional Information: No value filed.    Anesthesia Type:   General, ETT     Note:  Airway :Nasal Cannula  Patient transferred to:PACU  Handoff Report: Identifed the Patient, Identified the Reponsible Provider, Reviewed the pertinent medical history, Discussed the surgical course, Reviewed Intra-OP anesthesia mangement and issues during anesthesia, Set expectations for post-procedure period and Allowed opportunity for questions and acknowledgement of understanding      Vitals: (Last set prior to Anesthesia Care Transfer)    CRNA VITALS  4/2/2018 1048 - 4/2/2018 1121      4/2/2018             Pulse: 107    SpO2: 99 %    Resp Rate (observed): 13                Electronically Signed By: Sukh Hernandez CRNA, APRN CRNA  April 2, 2018  11:21 AM

## 2018-04-02 NOTE — IP AVS SNAPSHOT
[facility] Pre[facility]/Phase II    [address] [facility] [address]    Phone:  [phone]    Fax:  [phone]                                       After Visit Summary   4/2/2018    Britnye Marquez    MRN: [id number]           After Visit Summary Signature Page     I have received my discharge instructions, and my questions have been answered. I have discussed any challenges I see with this plan with the nurse or doctor.    ..........................................................................................................................................  Patient/Patient Representative Signature      ..........................................................................................................................................  Patient Representative Print Name and Relationship to Patient    ..................................................               ................................................  Date                                            Time    ..........................................................................................................................................  Reviewed by Signature/Title    ...................................................              ..............................................  Date                                                            Time

## 2018-04-02 NOTE — BRIEF OP NOTE
[facility]  Brief Op Note    Patient Name:Britney Marquez  MRN: [id number]  YOB: 1985  Surgery Date: 4/2/2018    Surgeon: Kina Lujan MD  Assistant: Monica Bravo NP, who instrumental in laparoscopic camera guidance and retraction.     Pre-operative Diagnosis: Desires sterilization  Post-operative Diagnosis: Same  Procedure: Laparoscopic bilateral salpingectomy    Anesthesia: General endotracheal  FRA Score: 1    EBL: 5 mL   IV fluids: 800 mL crystalloids  Urine Output: 50ml of clear urine straight catheterized at the beginning of the procedure    Complications: None  Specimen: Bilateral salpingectomy  Drains: None      Findings: Exam under anesthesia revealed normal sized retroverted uterus without masses. No adnexal masses palpated. Laparoscopy revealed normal appearing uterus without masses. Normal appearing bilateral fallopian tubes and ovaries. No evidence of endometriosis. Upper abdomen appears normal. Normal appearing appendix.     Technique: To follow with full operative note    Kina Lujan MD  [facility]

## 2018-04-02 NOTE — DISCHARGE INSTRUCTIONS
Same Day Surgery Discharge Instructions  Special Precautions After Surgery - Adult    1. It is not unusual to feel lightheaded or faint, up to 24 hours after surgery or while taking pain medication.  If you have these syoms; sit for a few minutes before standing and have someone assist you when getting up.  2. You should rest and relax for the next 24 hours and must have someone stay with you for at least 24 hours after your discharge.  3. DO NOT DRIVE any vehicle or operate mechanical equipment for 24 hours following the end of your surgery.  DO NOT DRIVE while taking narcotic pain medications that have been prescribed by your physician.  If you had a limb operated on, you must be able to use it fully to drive.  4. DO NOT drink alcoholic beverages for 24 hours following surgery or while taking prescription pain medication.  5. Drink clear liquids (apple juice, ginger ale, broth, 7-Up, etc.).  Progress to your regular diet as you feel able.  6. Any questions call your physician and do not make important decisions for 24 hours.    ___________________________________________________________________________________________________________________________  IMPORTANT NUMBERS:    [facility] [place]  [phone], [phone]  Pharmacy:  [phone]  Same Day Surgery:  [phone], Monday - Friday until 8:30 p.m.  Urgent Care:  [phone]  Emergency Room:  [phone]      [facility]:  [phone]                                                                             [facility]:  [phone] option 1  [facility]:  [phone]     [facility]:  [phone]   [facility]:  [phone]   Home Medical Equipment: [phone]  [facility]:  [phone]

## 2018-04-02 NOTE — ANESTHESIA POSTPROCEDURE EVALUATION
Patient: Britney Marquez    Procedure(s):  Laparoscopic bilateral tubal ligation - Wound Class: II-Clean Contaminated    Diagnosis:Desires sterilizaton  Diagnosis Additional Information: No value filed.    Anesthesia Type:  General, ETT    Note:  Anesthesia Post Evaluation    Patient location during evaluation: Bedside  Patient participation: Able to fully participate in evaluation  Level of consciousness: awake and alert  Pain management: adequate  Airway patency: patent  Cardiovascular status: acceptable  Respiratory status: acceptable  Hydration status: acceptable  PONV: none     Anesthetic complications: None          Last vitals:  Vitals:    04/02/18 1120 04/02/18 1130   BP: (!) 144/96 133/80   Pulse: 98 91   Resp: 15 16   Temp: 36.9  C (98.5  F)    SpO2: 100% 100%         Electronically Signed By: Sukh Hernandez CRNA, APRN CRNA  April 2, 2018  11:55 AM

## 2018-04-02 NOTE — ANESTHESIA PREPROCEDURE EVALUATION
Anesthesia Evaluation     .             ROS/MED HX    ENT/Pulmonary:     (+)tobacco use, Current use , . .    Neurologic:       Cardiovascular:         METS/Exercise Tolerance:     Hematologic:         Musculoskeletal:         GI/Hepatic:     (+) GERD       Renal/Genitourinary:         Endo:         Psychiatric:     (+) psychiatric history depression      Infectious Disease:         Malignancy:         Other:                     Physical Exam  Normal systems: cardiovascular, pulmonary and dental    Airway   Mallampati: II  TM distance: >3 FB  Neck ROM: full    Dental     Cardiovascular       Pulmonary                     Anesthesia Plan      History & Physical Review  History and physical reviewed and following examination; no interval change.    ASA Status:  2 .    NPO Status:  > 6 hours    Plan for General and ETT with Intravenous induction. Maintenance will be Balanced.    PONV prophylaxis:  Ondansetron (or other 5HT-3) and Dexamethasone or Solumedrol  Additional equipment: Videolaryngoscope      Postoperative Care  Postoperative pain management:  IV analgesics and Oral pain medications.      Consents  Anesthetic plan, risks, benefits and alternatives discussed with:  Patient..                          .

## 2018-04-02 NOTE — IP AVS SNAPSHOT
MRN:[id number]                      After Visit Summary   4/2/2018    Britney Marquez    MRN: [id number]           Thank you!     Thank you for choosing [facility] for your care. Our goal is always to provide you with excellent care. Hearing back from our patients is one way we can continue to improve our services. Please take a few minutes to complete the written survey that you may receive in the mail after you visit with us. Thank you!        Patient Information     Date Of Birth          1985        About your hospital stay     You were admitted on:  April 2, 2018 You last received care in the:  [facility] PreOP/Phase II    You were discharged on:  April 2, 2018       Who to Call     For medical emergencies, please call 911.  For non-urgent questions about your medical care, please call your primary care provider or clinic, [phone]  For questions related to your surgery, please call your surgery clinic        Attending Provider     Provider Specialty    Kina Lujan MD OB/Gyn       Primary Care Provider Office Phone # Fax #    Ivory Hicks [phone]      After Care Instructions     Discharge Instructions       Resume pre procedure diet            Discharge Instructions       Pelvic Rest. No tampons, douching or intercourse for  2  weeks.            Discharge Instructions       Patient to arrange follow up appointment in 4  Weeks    Call if you have any of the following:  Temperature > 100.4  Foul smelling vaginal discharge  Bleeding > 1 pad per hour x 2 hrs,   Pain not controlled by oral pain meds  Severe constipation or severe nausea or vomiting.            No driving or operating machinery       No driving or operating machinery while on narcotic pain medication            No lifting       No lifting over 10 pounds and no strenuous physical activity.  For 4 weeks            Shower        Shower on Post-op day  1.   DO NOT take a bath for a  few days                  Your next 10 appointments already scheduled     Apr 11, 2018  9:30 AM CDT   Women's Health Treatment with Solange Montano, [facility]   [facility] ([facility])    [address] [place] [id number] [phone]              Further instructions from your care team                           Same Day Surgery Discharge Instructions  Special Precautions After Surgery - Adult    1. It is not unusual to feel lightheaded or faint, up to 24 hours after surgery or while taking pain medication.  If you have these syoms; sit for a few minutes before standing and have someone assist you when getting up.  2. You should rest and relax for the next 24 hours and must have someone stay with you for at least 24 hours after your discharge.  3. DO NOT DRIVE any vehicle or operate mechanical equipment for 24 hours following the end of your surgery.  DO NOT DRIVE while taking narcotic pain medications that have been prescribed by your physician.  If you had a limb operated on, you must be able to use it fully to drive.  4. DO NOT drink alcoholic beverages for 24 hours following surgery or while taking prescription pain medication.  5. Drink clear liquids (apple juice, ginger ale, broth, 7-Up, etc.).  Progress to your regular diet as you feel able.  6. Any questions call your physician and do not make important decisions for 24 hours.    ___________________________________________________________________________________________________________________________  IMPORTANT NUMBERS:    [facility] Main Number:  [phone], [phone]  Pharmacy:  [phone]  Same Day Surgery:  [phone], Monday - Friday until 8:30 p.m.  Urgent Care:  [phone]  Emergency Room:  [phone]      [facility]:  [phone]                                                                             [facility]:  [phone] option 1  [facility]  Orthopedics:  [phone]     OB Clinic:  [phone]   Surgery Specialty Clinic:  [phone]   Home Medical Equipment: [phone]  [facility]:  [phone]        Pending Results     Date and Time Order Name Status Description    4/2/2018 1100 Surgical pathology exam In process             Admission Information     Date & Time Provider Department Dept. Phone    4/2/2018 Kina Lujan MD [facility] PreOP/Phase [phone]      Your Vitals Were     Blood Pressure Pulse Temperature Respirations Last Period Pulse Oximetry    116/74 61 98.5  F (36.9  C) (Oral) 16 01/10/2018 100%      MyChart Information     [employer] gives you secure access to your electronic health record. If you see a primary care provider, you can also send messages to your care team and make appointments. If you have questions, please call your primary care clinic.  If you do not have a primary care provider, please call [phone] and they will assist you.        Care EveryWhere ID     This is your Care EveryWhere ID. This could be used by other organizations to access your [facility] medical records  [id number]        Equal Access to Services     KIRSTEN GUTIÉRREZ AH: Hadii abel Riley, wapieterda nena, qaybta kaalmada jacob, robin traylor. So [facility] [phone].    ATENCIÓN: Si habla español, tiene a castro disposición servicios gratuitos de asistencia lingüística. [facility] [phone].    We comply with applicable federal civil rights laws and [place] laws. We do not discriminate on the basis of race, color, national origin, age, disability, sex, sexual orientation, or gender identity.               Review of your medicines      START taking        Dose / Directions    acetaminophen 325 MG tablet   Commonly known as:  TYLENOL        Dose:  650 mg   Take 2 tablets (650 mg) by mouth every 4 hours as needed for other (mild pain)   Quantity:  100 tablet   Refills:  0        ibuprofen 600 MG tablet   Commonly known as:  ADVIL/MOTRIN        Dose:  600 mg   Take 1 tablet (600 mg) by mouth every 6 hours as needed for pain (mild)   Quantity:  30 tablet   Refills:  0       ondansetron 4 MG ODT tab   Commonly known as:  ZOFRAN-ODT        Dose:  4-8 mg   Take 1-2 tablets (4-8 mg) by mouth every 8 hours as needed for nausea Dissolve ON the tongue.   Quantity:  10 tablet   Refills:  0       oxyCODONE IR 5 MG tablet   Commonly known as:  ROXICODONE        Dose:  5-10 mg   Take 1-2 tablets (5-10 mg) by mouth every 6 hours as needed for pain or other (Moderate to Severe)   Quantity:  30 tablet   Refills:  0       senna-docusate 8.6-50 MG per tablet   Commonly known as:  SENOKOT-S;PERICOLACE        Dose:  1-2 tablet   Take 1-2 tablets by mouth 2 times daily Take while on oral narcotics to prevent or treat constipation.   Quantity:  60 tablet   Refills:  0         CONTINUE these medicines which have NOT CHANGED        Dose / Directions    amitriptyline 10 MG tablet   Commonly known as:  ELAVIL   Used for:  PFD (pelvic floor dysfunction)        1 po QHS   Quantity:  90 tablet   Refills:  3       azithromycin 250 MG tablet   Commonly known as:  ZITHROMAX   Used for:  Acute bronchitis with symptoms > 10 days        Two tablets first day, then one tablet daily for four days.   Quantity:  6 tablet   Refills:  0       diazepam 5 MG tablet   Commonly known as:  VALIUM   Used for:  PFD (pelvic floor dysfunction)        1 tab vaginally twice a day   Quantity:  180 tablet   Refills:  1       doxycycline 100 MG capsule   Commonly known as:  VIBRAMYCIN   Used for:  Perioral dermatitis        Dose:  100 mg   Take 1 capsule (100 mg) by mouth 2 times daily   Quantity:  60 capsule   Refills:  1       levocetirizine 5 MG tablet   Commonly known as:  XYZAL   Used for:  Allergic rhinitis        TAKE ONE TABLET BY MOUTH EVERY EVENING   Quantity:  30 tablet   Refills:  1       BEATRIZ-28 0.15-30 MG-MCG per tablet   Used  for:  Surveillance for birth control, oral contraceptives   Generic drug:  levonorgestrel-ethinyl estradiol        TAKE 1 TABLET BY MOUTH EVERY DAY. TAKE  ACTIVE PILLS BY MOUTH  FOR 12 WEEKS (3 MONTHS), THEN PLACEBO TABLET FOR 1 WEEK, THEN RESTART   Quantity:  112 tablet   Refills:  1            Where to get your medicines      These medications were sent to [facility], [place] - [address]  [address] [place], [place] [address]     Phone:  [phone]     acetaminophen 325 MG tablet    ibuprofen 600 MG tablet    ondansetron 4 MG ODT tab    senna-docusate 8.6-50 MG per tablet         Some of these will need a paper prescription and others can be bought over the counter. Ask your nurse if you have questions.     Bring a paper prescription for each of these medications     oxyCODONE IR 5 MG tablet                Protect others around you: Learn how to safely use, store and throw away your medicines at www.disposemymeds.org.        Information about OPIOIDS     PRESCRIPTION OPIOIDS: WHAT YOU NEED TO KNOW    Prescription opioids can be used to help relieve moderate to severe pain and are often prescribed following a surgery or injury, or for certain health conditions. These medications can be an important part of treatment but also come with serious risks. It is important to work with your health care provider to make sure you are getting the safest, most effective care.    WHAT ARE THE RISKS AND SIDE EFFECTS OF OPIOID USE?  Prescription opioids carry serious risks of addiction and overdose, especially with prolonged use. An opioid overdose, often marked by slowed breathing can cause sudden death. The use of prescription opioids can have a number of side effects as well, even when taken as directed:      Tolerance - meaning you might need to take more of a medication for the same pain relief    Physical dependence - meaning you have symptoms of withdrawal when a medication is  stopped    Increased sensitivity to pain    Constipation    Nausea, vomiting, and dry mouth    Sleepiness and dizziness    Confusion    Depression    Low levels of testosterone that can result in lower sex drive, energy, and strength    Itching and sweating    RISKS ARE GREATER WITH:    History of drug misuse, substance use disorder, or overdose    Mental health conditions (such as depression or anxiety)    Sleep apnea    Older age (65 years or older)    Pregnancy    Avoid alcohol while taking prescription opioids.   Also, unless specifically advised by your health care provider, medications to avoid include:    Benzodiazepines (such as Xanax or Valium)    Muscle relaxants (such as Soma or Flexeril)    Hypnotics (such as Ambien or Lunesta)    Other prescription opioids    KNOW YOUR OPTIONS:  Talk to your health care provider about ways to manage your pain that do not involve prescription opioids. Some of these options may actually work better and have fewer risks and side effects:    Pain relievers such as acetaminophen, ibuprofen, and naproxen    Some medications that are also used for depression or seizures    Physical therapy and exercise    Cognitive behavioral therapy, a psychological, goal-directed approach, in which patients learn how to modify physical, behavioral, and emotional triggers of pain and stress    IF YOU ARE PRESCRIBED OPIOIDS FOR PAIN:    Never take opioids in greater amounts or more often than prescribed    Follow up with your primary health care provider and work together to create a plan on how to manage your pain.    Talk about ways to help manage your pain that do not involve prescription opioids    Talk about all concerns and side effects    Help prevent misuse and abuse    Never sell or share prescription opioids    Never use another person's prescription opioids    Store prescription opioids in a secure place and out of reach of others (this may include visitors, children, friends, and  family)    Visit www.cdc.gov/drugoverdose to learn about risks of opioid abuse and overdose    If you believe you may be struggling with addiction, tell your health care provider and ask for guidance or call [facility]'s National Helpline at 8-763-859-HELP    LEARN MORE / www.cdc.gov/drugoverdose/prescribing/guideline.html    Safely dispose of unused prescription opioids: Find your local drug take-back programs and more information about the importance of safe disposal at www.doseofreality.[place].gov             Medication List: This is a list of all your medications and when to take them. Check marks below indicate your daily home schedule. Keep this list as a reference.      Medications           Morning Afternoon Evening Bedtime As Needed    acetaminophen 325 MG tablet   Commonly known as:  TYLENOL   Take 2 tablets (650 mg) by mouth every 4 hours as needed for other (mild pain)                                amitriptyline 10 MG tablet   Commonly known as:  ELAVIL   1 po QHS                                azithromycin 250 MG tablet   Commonly known as:  ZITHROMAX   Two tablets first day, then one tablet daily for four days.                                diazepam 5 MG tablet   Commonly known as:  VALIUM   1 tab vaginally twice a day                                doxycycline 100 MG capsule   Commonly known as:  VIBRAMYCIN   Take 1 capsule (100 mg) by mouth 2 times daily                                ibuprofen 600 MG tablet   Commonly known as:  ADVIL/MOTRIN   Take 1 tablet (600 mg) by mouth every 6 hours as needed for pain (mild)                                levocetirizine 5 MG tablet   Commonly known as:  XYZAL   TAKE ONE TABLET BY MOUTH EVERY EVENING                                ondansetron 4 MG ODT tab   Commonly known as:  ZOFRAN-ODT   Take 1-2 tablets (4-8 mg) by mouth every 8 hours as needed for nausea Dissolve ON the tongue.                                oxyCODONE IR 5 MG tablet   Commonly known as:   ROXICODONE   Take 1-2 tablets (5-10 mg) by mouth every 6 hours as needed for pain or other (Moderate to Severe)   Last time this was given:  5 mg on 4/2/2018 12:05 PM                                BEATRIZ-28 0.15-30 MG-MCG per tablet   TAKE 1 TABLET BY MOUTH EVERY DAY. TAKE  ACTIVE PILLS BY MOUTH  FOR 12 WEEKS (3 MONTHS), THEN PLACEBO TABLET FOR 1 WEEK, THEN RESTART   Generic drug:  levonorgestrel-ethinyl estradiol                                senna-docusate 8.6-50 MG per tablet   Commonly known as:  SENOKOT-S;PERICOLACE   Take 1-2 tablets by mouth 2 times daily Take while on oral narcotics to prevent or treat constipation.

## 2018-04-02 NOTE — H&P (VIEW-ONLY)
[facility]  [address] [facility] [address]  [phone]  Dept: [phone]    PRE-OP EVALUATION:  Today's date: 3/15/2018    Britney Marquez (: 1985) presents for pre-operative evaluation assessment as requested by Dr. Lujan.  She requires evaluation and anesthesia risk assessment prior to undergoing surgery/procedure for treatment of tubal ligation .    Proposed Surgery/ Procedure: tubal ligation  Date of Surgery/ Procedure: 2018  Time of Surgery/ Procedure:   Hospital/Surgical Facility: [facility]    Primary Physician: Ivory Hicks  Type of Anesthesia Anticipated: General    Patient has a Health Care Directive or Living Will:  NO    1. NO - Do you have a history of heart attack, stroke, stent, bypass or surgery on an artery in the head, neck, heart or legs?  2. NO - Do you ever have any pain or discomfort in your chest?  3. NO - Do you have a history of  Heart Failure?  4. NO - Are you troubled by shortness of breath when: walking on the level, up a slight hill or at night?  5. YES - Do you currently have a cold, bronchitis or other respiratory infection?  Resolving bronchitis  6. YES- Do you have a cough, shortness of breath or wheezing? Resolving bronchitis  7. NO - Do you sometimes get pains in the calves of your legs when you walk?  8. NO - Do you or anyone in your family have previous history of blood clots?  9. NO - Do you or does anyone in your family have a serious bleeding problem such as prolonged bleeding following surgeries or cuts?  10. NO - Have you ever had problems with anemia or been told to take iron pills?  11. NO - Have you had any abnormal blood loss such as black, tarry or bloody stools, or abnormal vaginal bleeding?  12. YES - Have you ever had a blood transfusion?  13. NO - Have you or any of your relatives ever had problems with anesthesia?  14. NO - Do you have sleep apnea, excessive snoring or daytime drowsiness?  15. NO - Do you have any  prosthetic heart valves?  16. NO - Do you have prosthetic joints?  17. NO - Is there any chance that you may be pregnant?      HPI:     HPI related to upcoming procedure: desires sterilization    2 weeks of copious vaginal clear discharge, burns, a little white today.   She wants to be checked for STDs as well, as her partner has been unfaithful    Lab Results   Component Value Date    PAP NIL 10/07/2016    PAP NIL 2015    PAP NIL 10/29/2013      10-14 days of nasal congestion, cough, is getting better.   Is still smoking    Colitis-she has been taking over the counter pancreatic enzymes and is much improved, has been able to stop imodium. Has formed stools.     See problem list for active medical problems.  Problems all longstanding and stable, except as noted/documented.  See ROS for pertinent symptoms related to these conditions.                                                                                                  .    MEDICAL HISTORY:     Patient Active Problem List    Diagnosis Date Noted     PFD (pelvic floor dysfunction) 2017     Priority: Medium     S/P LEEP of cervix 2013     Priority: Medium     2002:ECC revealing focal ALLEY-3       CARDIOVASCULAR SCREENING; LDL GOAL LESS THAN 160 10/31/2010     Priority: Medium     Mild recurrent major depression (H) 10/24/2010     Priority: Medium     GERD (gastroesophageal reflux disease) 2010     Priority: Medium     Wrist injury 2008     Priority: Medium      Past Medical History:   Diagnosis Date     Mental disorders of mother, postpartum      Papanicolaou smear of cervix with atypical squamous cells of undetermined significance (ASC-US) 2001     S/P LEEP of cervix 2002    ECC revealing focal ALLEY-3- repeat pap in 2002 was normal (pt was less than 20 yrs of age)     Past Surgical History:   Procedure Laterality Date     C  DELIVERY ONLY  3/2/2003    Low transverse  section -[facility]-     SURGICAL HISTORY  OF -   5/02    LEEP ECC Showed HPV effect     SURGICAL HISTORY OF -       EEC ALLEY-III  pregnacy repeat pap     Current Outpatient Prescriptions   Medication Sig Dispense Refill     metroNIDAZOLE (FLAGYL) 500 MG tablet Take 1 tablet (500 mg) by mouth 2 times daily 14 tablet 0     fluconazole (DIFLUCAN) 150 MG tablet Take 1 tablet (150 mg) by mouth once for 1 dose 1 tablet 0     BEATRZI-28 0.15-30 MG-MCG per tablet TAKE 1 TABLET BY MOUTH EVERY DAY. TAKE  ACTIVE PILLS BY MOUTH  FOR 12 WEEKS (3 MONTHS), THEN PLACEBO TABLET FOR 1 WEEK, THEN RESTART 112 tablet 1     diazepam (VALIUM) 5 MG tablet 1 tab vaginally twice a day 180 tablet 1     amitriptyline (ELAVIL) 10 MG tablet 1 po QHS 90 tablet 3     doxycycline (VIBRAMYCIN) 100 MG capsule Take 1 capsule (100 mg) by mouth 2 times daily 60 capsule 1     levocetirizine (XYZAL) 5 MG tablet TAKE ONE TABLET BY MOUTH EVERY EVENING 30 tablet 1     azelastine (OPTIVAR) 0.05 % SOLN Apply 1 drop to eye 2 times daily 1 Bottle 2     OTC products: None, except as noted above    No Known Allergies   Latex Allergy: NO    Social History   Substance Use Topics     Smoking status: Current Every Day Smoker     Packs/day: 0.25     Years: 17.00     Types: Cigarettes     Smokeless tobacco: Never Used     Alcohol use Yes     History   Drug Use No       REVIEW OF SYSTEMS:   CONSTITUTIONAL: NEGATIVE for fever, chills, change in weight  ENT/MOUTH: NEGATIVE for ear, mouth and throat problems  RESP: positive for cough as above  CV: NEGATIVE for chest pain, palpitations or peripheral edema  : see above, No dysuria, urinary frequency or urgency.     EXAM:   /84 (BP Location: Left arm)  Pulse 117  Temp 97.8  F (36.6  C) (Tympanic)  Resp 16  Wt 124 lb (56.2 kg)  LMP 02/04/2018  SpO2 100%  BMI 20.63 kg/m2  GENERAL APPEARANCE: healthy, alert and no distress  HENT: ear canals and TM's normal and nose and mouth without ulcers or lesions  RESP: lungs with few inspiratory rhonchi, no crackles, no  wheezes  CV: regular rate and rhythm, normal S1 S2, no S3 or S4 and no murmur, click or rub   ABDOMEN: soft, nontender, no HSM or masses and bowel sounds normal   (female): see above  NEURO: Normal strength and tone, sensory exam grossly normal, mentation intact and speech normal    DIAGNOSTICS:     Results for orders placed or performed in visit on 03/15/18   CBC with platelets   Result Value Ref Range    WBC 7.6 4.0 - 11.0 10e9/L    RBC Count 4.30 3.8 - 5.2 10e12/L    Hemoglobin 13.4 11.7 - 15.7 g/dL    Hematocrit 40.0 35.0 - 47.0 %    MCV 93 78 - 100 fl    MCH 31.2 26.5 - 33.0 pg    MCHC 33.5 31.5 - 36.5 g/dL    RDW 13.0 10.0 - 15.0 %    Platelet Count 330 150 - 450 10e9/L   Beta HCG Qual, Urine - [facility] [place] ([id number])   Result Value Ref Range    Beta HCG Qual IFA Urine Negative NEG^Negative      Wet prep   Result Value Ref Range    Specimen Description Vagina     Wet Prep No yeast seen     Wet Prep No Trichomonas seen     Wet Prep Clue cells seen (A)         IMPRESSION:   Reason for surgery/procedure: desires sterilization    The proposed surgical procedure is considered INTERMEDIATE risk.    REVISED CARDIAC RISK INDEX  The patient has the following serious cardiovascular risks for perioperative complications such as (MI, PE, VFib and 3  AV Block):  No serious cardiac risks  INTERPRETATION: 0 risks: Class I (very low risk - 0.4% complication rate)    The patient has the following additional risks for perioperative complications:  No identified additional risks      ICD-10-CM    1. Preop general physical exam Z01.818    2. Encounter for sterilization Z30.2 CBC with platelets     Beta HCG Qual, Urine - [facility] [place] ([id number])   3. Possible exposure to STD Z20.2 NEISSERIA GONORRHOEA PCR     CHLAMYDIA TRACHOMATIS PCR     Wet prep   4. S/P LEEP of cervix Z98.890    5. Screening for human papillomavirus Z11.51 Pap imaged thin layer screen with HPV - recommended age 30 - 65 years (select HPV order  below)     HPV High Risk Types DNA Cervical   6. Bacterial vaginal infection N76.0 metroNIDAZOLE (FLAGYL) 500 MG tablet    B96.89 fluconazole (DIFLUCAN) 150 MG tablet   7. Candidiasis of mouth B37.0 fluconazole (DIFLUCAN) 150 MG tablet       RECOMMENDATIONS:         --Patient is to take all scheduled medications on the day of surgery EXCEPT for modifications listed below.    APPROVAL GIVEN to proceed with proposed procedure, without further diagnostic evaluation       Signed Electronically by: Ivory Hicks MD    Copy of this evaluation report is provided to requesting physician.    [facility] Preop Guidelines

## 2018-04-03 NOTE — OP NOTE
[facility]  Full Operative Note    Patient Name:Britney Marquez  MRN: [id number]  YOB: 1985  Surgery Date: 4/2/2018    Surgeon: Kina Lujan MD  Assistant: Monica Bravo NP, who instrumental in laparoscopic camera guidance and retraction.     Pre-operative Diagnosis: Desires sterilization  Post-operative Diagnosis: Same  Procedure: Laparoscopic bilateral salpingectomy    Anesthesia: General endotracheal  FRA Score: 1    EBL: 5 mL   IV fluids: 800 mL crystalloids  Urine Output: 50ml of clear urine straight catheterized at the beginning of the procedure    Complications: None  Specimen: Bilateral salpingectomy  Drains: None    Findings: Exam under anesthesia revealed normal sized retroverted uterus without masses. No adnexal masses palpated. Laparoscopy revealed normal appearing uterus without masses. Normal appearing bilateral fallopian tubes and ovaries. No evidence of endometriosis. Upper abdomen appears normal. Normal appearing appendix.     Indication: Ms. Britney Marquez 32 year old presented to clinic to discuss her desire for sterilization. Alternative long acting method of contraceptions were discussed and patient declined these options. Methods of sterilization were reviewed including laparoscopic versus hysteroscopic approaches with discussion of respective benefits and risks, but patient elected to proceed with laparoscopic approach in the form of bilateral salpingectomy. As such, above procedure was recommended. Risks discussed included but were not limited to bleeding, need for blood transfusion, infection, injury to surrounding organs (ie bowel/intestines, bladder, ureters, major blood vessels and nerves). If any of these organs are injured, then we identify it and try to fix it. If we cant fix it ourselves, then we consult surgeons that specialize in those areas that are damaged and they fix it for us. Unintended injuries can go unnoticed at the  time of surgery as well, and present with complications days to weeks later, which may require additional surgeries in the future to repair. Patient conveyed understanding of these risks and agreed to proceed. She signed the consent form.     Technique: The patient was taken to the operating room where she was placed in the dorsal lithotomy position with feet in yellow fin stirrups. General endotracheal anesthesia was administered. An exam under anesthesia was performed. The patient was then prepped and draped in the usual sterile fashion. A speculum was inserted into the vagina, a single toothed tenaculum placed on the cervix at 12 o'clock, and a uterine manipulator ([employer] uterine manipulator) inserted into the cervical os. The speculum was removed.     Attention was then turned to the upper abdomen where the infraumbilical skin was injected with 0.25% bupivacaine and a scalpel used to make 5 mm vertical incision in the umbilicus. A 5 mm trochar was placed in the umbilical incision confirming intrabdominal placement and no injury to the intra-abdominal organs. CO2 gas was attached to the trochar and opening pressure was less than 6 mmHg, and flow was increased to 40 L/min. Pneumoperitoneum was achieved with good tympany of the abdomen. A 5 mm skin incision was then made in the RLQ. Through this a second trochar sleeve was placed into the abdominal cavity using direct observation with the laparoscope. A 5 mm incision was then made in the LLQ and a third trocar sleeve was placed under direct visualization. A survey of the abdomen and pelvis was completed with above findings noted.     Attention was turned to the left fallopian tube, and was grasped and lifted into view. Using the bipolar Liga sure cautery,  the mesosalpinx was grasped at the fimbriated end and was serially cauterized and cut, proceeding proximally to the left uterine cornua. Finally, the proximal fallopian tube was grasped, cauterized and cut using  the Liga sure.    Similarly, the right fallopian tube, and was grasped and lifted into view. Using the bipolar Liga sure cautery,  the mesosalpinx was grasped at the fimbriated end and was serially cauterized and cut, proceeding proximally to the right uterine cornua. Finally, the proximal fallopian tube was grasped, cauterized and cut using the Liga sure.     The specimens were removed from the 5 mm port intact and sent together in a specimen container to pathology.   Operative sites were inspected and noted to be hemostatic. The bilateral ureters were  seen vermiculating through the retroperitoneum remaining outside of the operative zone. A final visual sweep of the pelvis showed no further pathology and hemostasis. The ports were removed under direct visualization. The pneumoperitoneum was expelled. The umbilical skin incision, LLQ skin incision and  RLQ skin incision were closed with 4-0 Vicryl and dermabond.   The speculum was reinserted into the vagina, the uterine manipulator ([employer]) removed, and the tenaculum removed from the cervix. Good hemostasis of the cervix was noted.    Instrument, sponge, and needle counts were correct times 2. The patient was extubated in the operating room and transferred to the PACU in stable condition.    Kina Lujan MD  [facility]

## 2018-04-04 LAB — COPATH REPORT: NORMAL

## 2018-04-05 NOTE — PROGRESS NOTES
Bilateral fallopian tubes removed are benign.   Will review at her post-op visit.     Kina Lujan MD  [facility]

## 2018-04-11 ENCOUNTER — MYC MEDICAL ADVICE (OUTPATIENT)
Dept: OBGYN | Facility: CLINIC | Age: 33
End: 2018-04-11

## 2018-04-11 ENCOUNTER — HOSPITAL ENCOUNTER (OUTPATIENT)
Dept: PHYSICAL THERAPY | Facility: CLINIC | Age: 33
Setting detail: THERAPIES SERIES
End: 2018-04-11
Attending: OBSTETRICS & GYNECOLOGY
Payer: COMMERCIAL

## 2018-04-11 PROCEDURE — 97110 THERAPEUTIC EXERCISES: CPT | Mod: GP | Performed by: PHYSICAL THERAPIST

## 2018-04-11 PROCEDURE — 97140 MANUAL THERAPY 1/> REGIONS: CPT | Mod: GP | Performed by: PHYSICAL THERAPIST

## 2018-04-11 PROCEDURE — 40000841 ZZH STATISTIC WOMEN'S HEALTH VISIT: Performed by: PHYSICAL THERAPIST

## 2018-04-11 NOTE — LETTER
2018        RE: Britney Marquez  [address]  [address]  [facility] [address]      To Whom It May Concern:    Britney Marquez ( 1985) is a patient here at [facility].  Due to a medical concern, please allow her to park next to the Employee Door   Sincerely,        Kina Lujan MD

## 2018-04-11 NOTE — PROGRESS NOTES
"PHYSICAL THERAPY DISCHARGE SUMMARY    DATE:  4/11/2018  NAME:  Britney Marquez           MR#:  [id number]  YOB: 1985  Diagnosis:  Pelvic Pain  Referring Physician:  Regine Prasad MD    Patient was seen from 1/10/18 to 4/11/18.    Session Number: 6/8 (Blue Plus - 40)      Subjective Report: Primary c/o is \"not too much today.\" Current diet and HEP is working.  Had Tubal Ligation on 4/2/18 without incident. Did not have to take pain pills after surgery, and f/u with only Tylenol.       Objective Measurements:  Objective Measure: Pain  Details: No real issues today.   Objective Measure: Void Times/Evacuation  Details: Taking \"Pancreatic Pills\" (extra enzymes for digestion) last 2 mos - OTC and feels this is helping.   Objective Measure: Palpation  Details: (P) Non-tender throughout abdomen, except over lateral incisional scars.   Objective Measure: Segmental Mobility  Details: (P) Not assessed as no symptoms warranting.   Objective Measure: Pelvic Alignment  Details: (P) WNL in sagittal plane and no transverse rotations noted.          Goals:    Goal Identifier STG   Goal Description (P) 1)Pt will imrpove control of PFM to report pain at 2/10 with standing tasks/personal care, in 4 weeks. Met: pt is painfree today. (Cont for 3 weeks)   Target Date 03/07/18   Date Met  (P) 04/11/18   Progress:     Goal Identifier STG   Goal Description (P) 2)Pt will report ability to shop for groceries for 15 mins in store, in 4 weeks. Met: pt is painfree, but still reports anxiety during shopping that she will get spasms during her shopping.  (cont for 3 weeks.)   Target Date 03/07/18   Date Met  (P) 04/11/18   Progress:     Goal Identifier LTG   Goal Description (P) 3)Pt will report painfree standing to get ready for work (1 hour) in 8 weeks. Met: pt is painfree today.   Target Date 03/07/18   Date Met  (P) 04/11/18   Progress:     Goal Identifier LTG   Goal Description (P) 4)Pt will report 2/10 or less pain with " sexual intercourse in 8 weeks. (Not assessed, states she is painfree)   Target Date 03/07/18   Date Met      Progress:     Goal Identifier LTG   Goal Description (P) 5)Pt will be ind in HEP to prevent return of symptoms in 8 weeks. Met: Pt is indep with her HEP.   Target Date 03/07/18   Date Met  (P) 04/11/18       Progress Toward Goals:   Progress this reporting period: Pt has met goals, see grid above.      Plan:  Discharge from therapy. Cont with HEP on her own.    Reason for Discharge:  Patient has met all goals.      Solange Montano, PT, MA  #[id number]

## 2018-04-11 NOTE — LETTER
2018        RE: Britney Marquez  [address]  [address]  [facility] [address]      To Whom It May Concern:    Britney Marquez ( 1985) is a patient here at [facility].  Due to a medical concern, please allow her to park next to the Employee Door due to recent surgery on 2018, valid through 2018.       Sincerely,        Kina Lujan MD

## 2018-05-04 ENCOUNTER — OFFICE VISIT (OUTPATIENT)
Dept: OBGYN | Facility: CLINIC | Age: 33
End: 2018-05-04
Payer: COMMERCIAL

## 2018-05-04 VITALS
BODY MASS INDEX: 20.8 KG/M2 | DIASTOLIC BLOOD PRESSURE: 73 MMHG | HEART RATE: 81 BPM | WEIGHT: 125 LBS | TEMPERATURE: 97 F | SYSTOLIC BLOOD PRESSURE: 113 MMHG

## 2018-05-04 DIAGNOSIS — Z80.41 FAMILY HX OF OVARIAN MALIGNANCY: ICD-10-CM

## 2018-05-04 DIAGNOSIS — Z48.89 POSTOPERATIVE VISIT: Primary | ICD-10-CM

## 2018-05-04 PROCEDURE — 99212 OFFICE O/P EST SF 10 MIN: CPT | Performed by: OBSTETRICS & GYNECOLOGY

## 2018-05-04 NOTE — NURSING NOTE
"Initial /73 (BP Location: Right arm, Patient Position: Chair, Cuff Size: Adult Regular)  Pulse 81  Temp 97  F (36.1  C) (Tympanic)  Wt 125 lb (56.7 kg)  Breastfeeding? No  BMI 20.8 kg/m2 Estimated body mass index is 20.8 kg/(m^2) as calculated from the following:    Height as of 12/22/17: 5' 5\" (1.651 m).    Weight as of this encounter: 125 lb (56.7 kg). .    Yani Delarosa    "

## 2018-05-04 NOTE — MR AVS SNAPSHOT
After Visit Summary   5/4/2018    Britney Marquez    MRN: [id number]           Patient Information     Date Of Birth          1985        Visit Information        Provider Department      5/4/2018 11:30 AM Kina Lujan MD [facility]        Today's Diagnoses     Postoperative visit    -  1    Family hx of ovarian malignancy           Follow-ups after your visit        Who to contact     If you have questions or need follow up information about today's clinic visit or your schedule please contact [facility] directly at [phone].  Normal or non-critical lab and imaging results will be communicated to you by Paltalkhart, letter or phone within 4 business days after the clinic has received the results. If you do not hear from us within 7 days, please contact the clinic through Paltalkhart or phone. If you have a critical or abnormal lab result, we will notify you by phone as soon as possible.  Submit refill requests through [email or web address] or call your pharmacy and they will forward the refill request to us. Please allow 3 business days for your refill to be completed.          Additional Information About Your Visit        MyChart Information     [email or web address] gives you secure access to your electronic health record. If you see a primary care provider, you can also send messages to your care team and make appointments. If you have questions, please call your primary care clinic.  If you do not have a primary care provider, please call [phone] and they will assist you.        Care EveryWhere ID     This is your Care EveryWhere ID. This could be used by other organizations to access your [facility] medical records  [id number]        Your Vitals Were     Pulse Temperature Breastfeeding? BMI (Body Mass Index)          81 97  F (36.1  C) (Tympanic) No 20.8 kg/m2         Blood Pressure from Last 3 Encounters:   05/04/18 113/73   04/02/18 106/69   03/15/18 124/84    Weight  from Last 3 Encounters:   05/04/18 125 lb (56.7 kg)   03/15/18 124 lb (56.2 kg)   03/02/18 129 lb (58.5 kg)              Today, you had the following     No orders found for display       Primary Care Provider Office Phone # Fax #    Ivory Hicks [phone]       [address] [facility] [address]        Equal Access to Services     KIRSTEN GUTIÉRREZ : Hadii aad ku hadasho Soomaali, waaxda luqadaha, qaybta kaalmada adeegyada, waxay idiin hayaan adeeg efraín ladante . So [facility] [phone].    ATENCIÓN: Si gómezla espamber, tiene a castro disposición servicios gratuitos de asistencia lingüística. Redame al [phone].    We comply with applicable federal civil rights laws and [place] laws. We do not discriminate on the basis of race, color, national origin, age, disability, sex, sexual orientation, or gender identity.            Thank you!     Thank you for choosing [facility]  for your care. Our goal is always to provide you with excellent care. Hearing back from our patients is one way we can continue to improve our services. Please take a few minutes to complete the written survey that you may receive in the mail after your visit with us. Thank you!             Your Updated Medication List - Protect others around you: Learn how to safely use, store and throw away your medicines at www.disposemymeds.org.          This list is accurate as of 5/4/18 11:59 AM.  Always use your most recent med list.                   Brand Name Dispense Instructions for use Diagnosis    acetaminophen 325 MG tablet    TYLENOL    100 tablet    Take 2 tablets (650 mg) by mouth every 4 hours as needed for other (mild pain)        amitriptyline 10 MG tablet    ELAVIL    90 tablet    1 po QHS    PFD (pelvic floor dysfunction)       azithromycin 250 MG tablet    ZITHROMAX    6 tablet    Two tablets first day, then one tablet daily for four days.    Acute bronchitis with symptoms > 10 days       diazepam 5 MG tablet     VALIUM    180 tablet    1 tab vaginally twice a day    PFD (pelvic floor dysfunction)       doxycycline 100 MG capsule    VIBRAMYCIN    60 capsule    Take 1 capsule (100 mg) by mouth 2 times daily    Perioral dermatitis       ibuprofen 600 MG tablet    ADVIL/MOTRIN    30 tablet    Take 1 tablet (600 mg) by mouth every 6 hours as needed for pain (mild)        levocetirizine 5 MG tablet    XYZAL    30 tablet    TAKE ONE TABLET BY MOUTH EVERY EVENING    Allergic rhinitis       ondansetron 4 MG ODT tab    ZOFRAN-ODT    10 tablet    Take 1-2 tablets (4-8 mg) by mouth every 8 hours as needed for nausea Dissolve ON the tongue.        oxyCODONE IR 5 MG tablet    ROXICODONE    30 tablet    Take 1-2 tablets (5-10 mg) by mouth every 6 hours as needed for pain or other (Moderate to Severe)        BEATRIZ-28 0.15-30 MG-MCG per tablet   Generic drug:  levonorgestrel-ethinyl estradiol     112 tablet    TAKE 1 TABLET BY MOUTH EVERY DAY. TAKE  ACTIVE PILLS BY MOUTH  FOR 12 WEEKS (3 MONTHS), THEN PLACEBO TABLET FOR 1 WEEK, THEN RESTART    Surveillance for birth control, oral contraceptives       senna-docusate 8.6-50 MG per tablet    SENOKOT-S;PERICOLACE    60 tablet    Take 1-2 tablets by mouth 2 times daily Take while on oral narcotics to prevent or treat constipation.

## 2018-05-04 NOTE — PROGRESS NOTES
Post-op visit.     Ms. Britney Marquez 32 year old returns for a post-op visit.   She is s/p laparoscopic bilateral salpingectomy for sterilization on 2018.   Final pathology returned benign.   No complaints or issues at this time.     She reports her mother was recently diagnosed with Stage 4A ovarian cancer.   She is doing her best to cope with this. They plan on given her chemotherapy.   Inquires about risk of her getting ovarian cancer. Her mother is the only one diagnosed with ovarian cancer in her family.   No family history of breast cancer, colon cancer.     Past Medical History:   Diagnosis Date     Mental disorders of mother, postpartum      Papanicolaou smear of cervix with atypical squamous cells of undetermined significance (ASC-US) 2001     S/P LEEP of cervix 2002    ECC revealing focal ALLEY-3- repeat pap in 2002 was normal (pt was less than 20 yrs of age)     Past Surgical History:   Procedure Laterality Date     C  DELIVERY ONLY  3/2/2003    Low transverse  section -[facility]-     LAPAROSCOPIC TUBAL LIGATION Bilateral 2018    Procedure: LAPAROSCOPIC TUBAL LIGATION;  Laparoscopic bilateral tubal ligation;  Surgeon: Kina Lujan MD;  Location: [facility]     SURGICAL HISTORY OF -       LEEP ECC Showed HPV effect     SURGICAL HISTORY OF -       EEC ALLEY-III  pregnacy repeat pap     Current Outpatient Prescriptions   Medication     acetaminophen (TYLENOL) 325 MG tablet     amitriptyline (ELAVIL) 10 MG tablet     azithromycin (ZITHROMAX) 250 MG tablet     diazepam (VALIUM) 5 MG tablet     doxycycline (VIBRAMYCIN) 100 MG capsule     ibuprofen (ADVIL/MOTRIN) 600 MG tablet     levocetirizine (XYZAL) 5 MG tablet     ondansetron (ZOFRAN-ODT) 4 MG ODT tab     oxyCODONE IR (ROXICODONE) 5 MG tablet     BEATRIZ-28 0.15-30 MG-MCG per tablet     senna-docusate (SENOKOT-S;PERICOLACE) 8.6-50 MG per tablet     No current facility-administered medications for this visit.        No Known Allergies    Exam:   /73 (BP Location: Right arm, Patient Position: Chair, Cuff Size: Adult Regular)  Pulse 81  Temp 97  F (36.1  C) (Tympanic)  Wt 125 lb (56.7 kg)  Breastfeeding? No  BMI 20.8 kg/m2  General Appearance: Well nourished, well developed female, NAD, AOx3  Neurological: Mental Status Normal and Station and Gait Normal   Skin: Normal skin turgor  HEENT: Atraumatic, normocephalic, EOMI  Lungs: Good respiratory effort  Abdomen: Soft, NT, ND, no masses. Incisions well healed.   Pelvic: deferred  Extremities: No clubbing, no cyanosis and no edema    Pathology:   FINAL DIAGNOSIS:   Bilateral fallopian tubes, bilateral, laparoscopic salpingectomy:   - Bilateral fallopian tubes without diagnostic abnormalities. Electronically signed out by: Pan Amato M.D.     A/P:   1) Post-op visit  -- lifting restrictions no longer indicated  -- reviewed pathology report     2) Family hx of ovarian cancer  -- inform patient that in most instances, diagnosis of ovarian cancer is sporadic   -- given lack of extensive family history involvement reassured patient that she is low risk  -- discussed that pursuit of prophylactic oophorectomy may present with more risk (surgial menopause and increase risk in  all cause mortality) vs the low yield benefit of preventing ovarian cancer      Kina Lujan MD  [facility]

## 2018-07-20 ENCOUNTER — MYC MEDICAL ADVICE (OUTPATIENT)
Dept: OBGYN | Facility: CLINIC | Age: 33
End: 2018-07-20

## 2018-08-09 ENCOUNTER — OFFICE VISIT (OUTPATIENT)
Dept: OBGYN | Facility: CLINIC | Age: 33
End: 2018-08-09
Payer: COMMERCIAL

## 2018-08-09 VITALS
HEIGHT: 65 IN | TEMPERATURE: 97.3 F | HEART RATE: 69 BPM | BODY MASS INDEX: 20.66 KG/M2 | WEIGHT: 124 LBS | DIASTOLIC BLOOD PRESSURE: 70 MMHG | SYSTOLIC BLOOD PRESSURE: 108 MMHG | RESPIRATION RATE: 18 BRPM

## 2018-08-09 DIAGNOSIS — M62.89 PELVIC FLOOR DYSFUNCTION: Primary | ICD-10-CM

## 2018-08-09 DIAGNOSIS — N31.8 FREQUENCY-URGENCY SYNDROME: ICD-10-CM

## 2018-08-09 PROCEDURE — 99214 OFFICE O/P EST MOD 30 MIN: CPT | Performed by: OBSTETRICS & GYNECOLOGY

## 2018-08-09 RX ORDER — CYCLOBENZAPRINE HCL 10 MG
5-10 TABLET ORAL 3 TIMES DAILY PRN
Qty: 30 TABLET | Refills: 1 | Status: SHIPPED | OUTPATIENT
Start: 2018-08-09 | End: 2019-01-11

## 2018-08-09 RX ORDER — NORETHINDRONE ACETATE 5 MG
5 TABLET ORAL DAILY
Qty: 84 TABLET | Refills: 3 | Status: SHIPPED | OUTPATIENT
Start: 2018-08-09 | End: 2020-03-24

## 2018-08-09 NOTE — MR AVS SNAPSHOT
"              After Visit Summary   8/9/2018    Britney Marquez    MRN: [id number]           Patient Information     Date Of Birth          1985        Visit Information        Provider Department      8/9/2018 1:00 PM Regine Prasad MD [facility]        Today's Diagnoses     Pelvic floor dysfunction    -  1    Frequency-urgency syndrome           Follow-ups after your visit        Who to contact     If you have questions or need follow up information about today's clinic visit or your schedule please contact [facility] directly at [phone].  Normal or non-critical lab and imaging results will be communicated to you by "[employer]hart, letter or phone within 4 business days after the clinic has received the results. If you do not hear from us within 7 days, please contact the clinic through [employer] or phone. If you have a critical or abnormal lab result, we will notify you by phone as soon as possible.  Submit refill requests through [employer] or call your pharmacy and they will forward the refill request to us. Please allow 3 business days for your refill to be completed.          Additional Information About Your Visit        MyChart Information     [employer] gives you secure access to your electronic health record. If you see a primary care provider, you can also send messages to your care team and make appointments. If you have questions, please call your primary care clinic.  If you do not have a primary care provider, please call [phone] and they will assist you.        Care EveryWhere ID     This is your Care EveryWhere ID. This could be used by other organizations to access your [place] medical records  [id number]        Your Vitals Were     Pulse Temperature Respirations Height Last Period BMI (Body Mass Index)    69 97.3  F (36.3  C) (Tympanic) 18 5' 5\" (1.651 m) 07/09/2018 20.63 kg/m2       Blood Pressure from Last 3 Encounters:   08/09/18 108/70   05/04/18 " 113/73   04/02/18 106/69    Weight from Last 3 Encounters:   08/09/18 124 lb (56.2 kg)   05/04/18 125 lb (56.7 kg)   03/15/18 124 lb (56.2 kg)              We Performed the Following     Aleksandra-Operative Worksheet GYN          Today's Medication Changes          These changes are accurate as of 8/9/18  1:27 PM.  If you have any questions, ask your nurse or doctor.               Start taking these medicines.        Dose/Directions    cyclobenzaprine 10 MG tablet   Commonly known as:  FLEXERIL   Used for:  Pelvic floor dysfunction   Started by:  Regine Prasad MD        Dose:  5-10 mg   Take 0.5-1 tablets (5-10 mg) by mouth 3 times daily as needed for muscle spasms   Quantity:  30 tablet   Refills:  1       norethindrone 5 MG tablet   Commonly known as:  AYGESTIN   Used for:  Pelvic floor dysfunction   Started by:  Regine Prasad MD        Dose:  5 mg   Take 1 tablet (5 mg) by mouth daily   Quantity:  84 tablet   Refills:  3         Stop taking these medicines if you haven't already. Please contact your care team if you have questions.     azithromycin 250 MG tablet   Commonly known as:  ZITHROMAX   Stopped by:  Regine Prasad MD           diazepam 5 MG tablet   Commonly known as:  VALIUM   Stopped by:  Regine Prasad MD           ibuprofen 600 MG tablet   Commonly known as:  ADVIL/MOTRIN   Stopped by:  Regine Prasad MD           ondansetron 4 MG ODT tab   Commonly known as:  ZOFRAN-ODT   Stopped by:  Regine Prasad MD           oxyCODONE IR 5 MG tablet   Commonly known as:  ROXICODONE   Stopped by:  Regine Prasad MD           BEATRIZ-28 0.15-30 MG-MCG per tablet   Generic drug:  levonorgestrel-ethinyl estradiol   Stopped by:  Regine Prasad MD           senna-docusate 8.6-50 MG per tablet   Commonly known as:  SENOKOT-S;PERICOLACE   Stopped by:  Regine Prasad MD                Where to get your medicines      These  medications [place] [facility] [place] [address], [facility] [address]     Phone:  [phone]     cyclobenzaprine 10 MG tablet    norethindrone 5 MG tablet                Primary Care Provider Office Phone # Fax #    Ivory Hicks [phone]       [address] [facility] [address]        Equal Access to Services     KIRSTEN GUTIÉRREZ : Hadii aad ku hadasho Soomaali, waaxda luqadaha, qaybta kaalmada adeegyada, waxay idiin hayaan adeeg khromysh la'maria e . So [facility] [phone].    ATENCIÓN: Si habla espamber, tiene a castro disposición servicios gratuitos de asistencia lingüística. [facility] [phone].    We comply with applicable federal civil rights laws and [place] laws. We do not discriminate on the basis of race, color, national origin, age, disability, sex, sexual orientation, or gender identity.            Thank you!     Thank you for choosing [facility]  for your care. Our goal is always to provide you with excellent care. Hearing back from our patients is one way we can continue to improve our services. Please take a few minutes to complete the written survey that you may receive in the mail after your visit with us. Thank you!             Your Updated Medication List - Protect others around you: Learn how to safely use, store and throw away your medicines at www.disposemymeds.org.          This list is accurate as of 8/9/18  1:27 PM.  Always use your most recent med list.                   Brand Name Dispense Instructions for use Diagnosis    acetaminophen 325 MG tablet    TYLENOL    100 tablet    Take 2 tablets (650 mg) by mouth every 4 hours as needed for other (mild pain)        amitriptyline 10 MG tablet    ELAVIL    90 tablet    1 po QHS    PFD (pelvic floor dysfunction)       cyclobenzaprine 10 MG tablet    FLEXERIL    30 tablet    Take 0.5-1 tablets (5-10 mg) by mouth 3 times daily as needed for muscle spasms    Pelvic  floor dysfunction       doxycycline 100 MG capsule    VIBRAMYCIN    60 capsule    Take 1 capsule (100 mg) by mouth 2 times daily    Perioral dermatitis       levocetirizine 5 MG tablet    XYZAL    30 tablet    TAKE ONE TABLET BY MOUTH EVERY EVENING    Allergic rhinitis       norethindrone 5 MG tablet    AYGESTIN    84 tablet    Take 1 tablet (5 mg) by mouth daily    Pelvic floor dysfunction

## 2018-08-09 NOTE — PROGRESS NOTES
Britney is a 33 year old   female who presents for ongoing issues with pelvic floor dysfunction: manifest primarily as spasm in pelvis that radiates into groin, difficulty with sitting due to pain, feeling of urinary frquency and urgency.  She has tried vaginal Valium (made her too groggy), pelvic floor PT (which helped a lot); she does exercises ongoing but is finding symptoms are flaring again;  SHe had tubal ligation earlier this year, and cycling of hormones seems to be one flare source..    Patient Active Problem List    Diagnosis Date Noted     PFD (pelvic floor dysfunction) 2017     Priority: Medium     S/P LEEP of cervix 2013     Priority: Medium     2002:ECC revealing focal ALLEY-3       CARDIOVASCULAR SCREENING; LDL GOAL LESS THAN 160 10/31/2010     Priority: Medium     Mild recurrent major depression (H) 10/24/2010     Priority: Medium     GERD (gastroesophageal reflux disease) 2010     Priority: Medium       All systems were reviewed and pertinent information in noted in subjective/HPI.    Past Medical History:   Diagnosis Date     Mental disorders of mother, postpartum      Papanicolaou smear of cervix with atypical squamous cells of undetermined significance (ASC-US) 2001     S/P LEEP of cervix 2002    ECC revealing focal ALLEY-3- repeat pap in 2002 was normal (pt was less than 20 yrs of age)       Past Surgical History:   Procedure Laterality Date     C  DELIVERY ONLY  3/2/2003    Low transverse  section -[facility]-     LAPAROSCOPIC TUBAL LIGATION Bilateral 2018    Procedure: LAPAROSCOPIC TUBAL LIGATION;  Laparoscopic bilateral tubal ligation;  Surgeon: Kina Lujan MD;  Location: [facility]     SURGICAL HISTORY OF -       LEEP ECC Showed HPV effect     SURGICAL HISTORY OF -       EEC ALLEY-III  pregnacy repeat pap         Current Outpatient Prescriptions:      acetaminophen (TYLENOL) 325 MG tablet, Take 2 tablets (650 mg) by mouth every 4  "hours as needed for other (mild pain), Disp: 100 tablet, Rfl: 0     amitriptyline (ELAVIL) 10 MG tablet, 1 po QHS, Disp: 90 tablet, Rfl: 3     cyclobenzaprine (FLEXERIL) 10 MG tablet, Take 0.5-1 tablets (5-10 mg) by mouth 3 times daily as needed for muscle spasms, Disp: 30 tablet, Rfl: 1     doxycycline (VIBRAMYCIN) 100 MG capsule, Take 1 capsule (100 mg) by mouth 2 times daily, Disp: 60 capsule, Rfl: 1     levocetirizine (XYZAL) 5 MG tablet, TAKE ONE TABLET BY MOUTH EVERY EVENING, Disp: 30 tablet, Rfl: 1     norethindrone (AYGESTIN) 5 MG tablet, Take 1 tablet (5 mg) by mouth daily, Disp: 84 tablet, Rfl: 3    ALLERGIES:  Review of patient's allergies indicates no known allergies.    Social History     Social History     Marital status: Single     Spouse name: N/A     Number of children: N/A     Years of education: N/A     Social History Main Topics     Smoking status: Current Every Day Smoker     Packs/day: 0.25     Years: 17.00     Types: Cigarettes     Smokeless tobacco: Never Used     Alcohol use Yes     Drug use: No     Sexual activity: Yes     Partners: Male     Birth control/ protection: Pill     Other Topics Concern     Parent/Sibling W/ Cabg, Mi Or Angioplasty Before 65f 55m? No     Social History Narrative       Family History   Problem Relation Age of Onset     Allergies Mother      Respiratory Mother      asthma     Hypertension Father        OBJECTIVE:  Vitals: /70 (BP Location: Right arm, Patient Position: Chair, Cuff Size: Adult Small)  Pulse 69  Temp 97.3  F (36.3  C) (Tympanic)  Resp 18  Ht 5' 5\" (1.651 m)  Wt 124 lb (56.2 kg)  LMP 07/09/2018  BMI 20.63 kg/m2 BMI= Body mass index is 20.63 kg/(m^2).   Patient's last menstrual period was 07/09/2018.     GENERAL APPEARANCE: healthy, alert and no distress    ASSESSMENT:      ICD-10-CM    1. Pelvic floor dysfunction M62.89 cyclobenzaprine (FLEXERIL) 10 MG tablet     norethindrone (AYGESTIN) 5 MG tablet       PLAN:  Discussed trying to " mitigate cycling with continuous progestin therapy as well as use of Flexeril for flare of pain  Also had extended conversation about potential utility of sacral nerve stimulation therapy (trial especially) to see if can improve high-tone pelvic floor spasms and OAB symptoms)  Chelita Prasad MD  [facility]    Duration of visit:  25 minutes, 100% in discussion of current issues, treatment options and treatment planning.  CHELITA Prasad MD

## 2018-08-15 DIAGNOSIS — R10.9 FLANK PAIN: Primary | ICD-10-CM

## 2018-08-28 ENCOUNTER — OFFICE VISIT (OUTPATIENT)
Dept: OBGYN | Facility: CLINIC | Age: 33
End: 2018-08-28
Payer: COMMERCIAL

## 2018-08-28 DIAGNOSIS — M62.89 PFD (PELVIC FLOOR DYSFUNCTION): ICD-10-CM

## 2018-08-28 DIAGNOSIS — N32.81 OVERACTIVE BLADDER: Primary | ICD-10-CM

## 2018-08-28 DIAGNOSIS — R10.9 FLANK PAIN: ICD-10-CM

## 2018-08-28 DIAGNOSIS — N31.8 FREQUENCY-URGENCY SYNDROME: ICD-10-CM

## 2018-08-28 LAB
ALBUMIN UR-MCNC: NEGATIVE MG/DL
APPEARANCE UR: CLEAR
BILIRUB UR QL STRIP: NEGATIVE
COLOR UR AUTO: YELLOW
GLUCOSE UR STRIP-MCNC: NEGATIVE MG/DL
HGB UR QL STRIP: NEGATIVE
KETONES UR STRIP-MCNC: NEGATIVE MG/DL
LEUKOCYTE ESTERASE UR QL STRIP: NEGATIVE
NITRATE UR QL: NEGATIVE
PH UR STRIP: 5.5 PH (ref 5–7)
SOURCE: NORMAL
SP GR UR STRIP: 1.02 (ref 1–1.03)
UROBILINOGEN UR STRIP-ACNC: 0.2 EU/DL (ref 0.2–1)

## 2018-08-28 PROCEDURE — 64561 IMPLANT NEUROELECTRODES: CPT | Mod: 50 | Performed by: OBSTETRICS & GYNECOLOGY

## 2018-08-28 PROCEDURE — 87086 URINE CULTURE/COLONY COUNT: CPT | Performed by: OBSTETRICS & GYNECOLOGY

## 2018-08-28 PROCEDURE — 81003 URINALYSIS AUTO W/O SCOPE: CPT | Performed by: OBSTETRICS & GYNECOLOGY

## 2018-08-28 NOTE — MR AVS SNAPSHOT
After Visit Summary   8/28/2018    Britney Marquez    MRN: [id number]           Patient Information     Date Of Birth          1985        Visit Information        Provider Department      8/28/2018 2:00 PM [place], Ob Nurse -; Regine Prasad MD; [facility]        Today's Diagnoses     Overactive bladder    -  1    Frequency-urgency syndrome        PFD (pelvic floor dysfunction)           Follow-ups after your visit        Your next 10 appointments already scheduled     Aug 31, 2018  2:45 PM CDT   SHORT with Ob Nurse - [facility] ([facility])    [address] [facility] [address] [phone]              Who to contact     If you have questions or need follow up information about today's clinic visit or your schedule please contact [facility] directly at [phone].  Normal or non-critical lab and imaging results will be communicated to you by MyChart, letter or phone within 4 business days after the clinic has received the results. If you do not hear from us within 7 days, please contact the clinic through [employer] or phone. If you have a critical or abnormal lab result, we will notify you by phone as soon as possible.  Submit refill requests through [employer] or call your pharmacy and they will forward the refill request to us. Please allow 3 business days for your refill to be completed.          Additional Information About Your Visit        MyChart Information     [employer] gives you secure access to your electronic health record. If you see a primary care provider, you can also send messages to your care team and make appointments. If you have questions, please call your primary care clinic.  If you do not have a primary care provider, please call [phone] and they will assist you.        Care EveryWhere ID     This is your Care EveryWhere ID. This could be used by other  organizations to access your [place] medical records  [id number]         Blood Pressure from Last 3 Encounters:   08/09/18 108/70   05/04/18 113/73   04/02/18 106/69    Weight from Last 3 Encounters:   08/09/18 124 lb (56.2 kg)   05/04/18 125 lb (56.7 kg)   03/15/18 124 lb (56.2 kg)              Today, you had the following     No orders found for display       Primary Care Provider Office Phone # Fax #    Ivory Hicks [phone]       [address] [facility] [address]        Equal Access to Services     [facility]: Hadii abel valdez hadasho Sobrandee, waaxda luqadaha, qaybta kaalmada jacob, robin schmid . So [facility] [phone].    ATENCIÓN: Si habla español, tiene a castro disposición servicios gratuitos de asistencia lingüística. [facility] [phone].    We comply with applicable federal civil rights laws and [place] laws. We do not discriminate on the basis of race, color, national origin, age, disability, sex, sexual orientation, or gender identity.            Thank you!     Thank you for choosing [facility]  for your care. Our goal is always to provide you with excellent care. Hearing back from our patients is one way we can continue to improve our services. Please take a few minutes to complete the written survey that you may receive in the mail after your visit with us. Thank you!             Your Updated Medication List - Protect others around you: Learn how to safely use, store and throw away your medicines at www.disposemymeds.org.          This list is accurate as of 8/28/18  2:47 PM.  Always use your most recent med list.                   Brand Name Dispense Instructions for use Diagnosis    acetaminophen 325 MG tablet    TYLENOL    100 tablet    Take 2 tablets (650 mg) by mouth every 4 hours as needed for other (mild pain)        amitriptyline 10 MG tablet    ELAVIL    90 tablet    1 po QHS    PFD (pelvic floor dysfunction)        cyclobenzaprine 10 MG tablet    FLEXERIL    30 tablet    Take 0.5-1 tablets (5-10 mg) by mouth 3 times daily as needed for muscle spasms    Pelvic floor dysfunction       doxycycline 100 MG capsule    VIBRAMYCIN    60 capsule    Take 1 capsule (100 mg) by mouth 2 times daily    Perioral dermatitis       levocetirizine 5 MG tablet    XYZAL    30 tablet    TAKE ONE TABLET BY MOUTH EVERY EVENING    Allergic rhinitis       norethindrone 5 MG tablet    AYGESTIN    84 tablet    Take 1 tablet (5 mg) by mouth daily    Pelvic floor dysfunction

## 2018-08-28 NOTE — PROGRESS NOTES
Britney is a 33 year old   Ab0  female who presents for placement of Interstim test leads for test simulation in treatment for Overactive bladder.  She has previously been treated with pelvic floor PT, vaginal diazepam and anticholinergic medications; the pelvic floor PT worked the best, but was not sustained. Appropriate informed consent was obtained for the procedure..  No LMP recorded.    Previous Pap Hx:  Normal    Menstrual Hx:  regular    All systems were reviewed and pertinent information in noted in subjective/HPI.    Patient Active Problem List    Diagnosis Date Noted     Overactive bladder 2018     Priority: Medium     Frequency-urgency syndrome 2018     Priority: Medium     PFD (pelvic floor dysfunction) 2017     Priority: Medium     S/P LEEP of cervix 2013     Priority: Medium     2002:ECC revealing focal ALLEY-3       CARDIOVASCULAR SCREENING; LDL GOAL LESS THAN 160 10/31/2010     Priority: Medium     Mild recurrent major depression (H) 10/24/2010     Priority: Medium     GERD (gastroesophageal reflux disease) 2010     Priority: Medium       Past Medical History:   Diagnosis Date     Mental disorders of mother, postpartum      Papanicolaou smear of cervix with atypical squamous cells of undetermined significance (ASC-US) 2001     S/P LEEP of cervix 2002    ECC revealing focal ALLEY-3- repeat pap in 2002 was normal (pt was less than 20 yrs of age)       Past Surgical History:   Procedure Laterality Date     C  DELIVERY ONLY  3/2/2003    Low transverse  section -Breech-     LAPAROSCOPIC TUBAL LIGATION Bilateral 2018    Procedure: LAPAROSCOPIC TUBAL LIGATION;  Laparoscopic bilateral tubal ligation;  Surgeon: Kina Lujan MD;  Location: [facility] OR     SURGICAL HISTORY OF -       LEEP ECC Showed HPV effect     SURGICAL HISTORY OF -       EEC ALLEY-III  pregnacy repeat pap         Current Outpatient Prescriptions:      acetaminophen  (TYLENOL) 325 MG tablet, Take 2 tablets (650 mg) by mouth every 4 hours as needed for other (mild pain), Disp: 100 tablet, Rfl: 0     amitriptyline (ELAVIL) 10 MG tablet, 1 po QHS, Disp: 90 tablet, Rfl: 3     cyclobenzaprine (FLEXERIL) 10 MG tablet, Take 0.5-1 tablets (5-10 mg) by mouth 3 times daily as needed for muscle spasms, Disp: 30 tablet, Rfl: 1     doxycycline (VIBRAMYCIN) 100 MG capsule, Take 1 capsule (100 mg) by mouth 2 times daily, Disp: 60 capsule, Rfl: 1     levocetirizine (XYZAL) 5 MG tablet, TAKE ONE TABLET BY MOUTH EVERY EVENING, Disp: 30 tablet, Rfl: 1     norethindrone (AYGESTIN) 5 MG tablet, Take 1 tablet (5 mg) by mouth daily, Disp: 84 tablet, Rfl: 3    ALLERGIES:  Review of patient's allergies indicates no known allergies.    Social History     Social History     Marital status: Single     Spouse name: N/A     Number of children: N/A     Years of education: N/A     Social History Main Topics     Smoking status: Current Every Day Smoker     Packs/day: 0.25     Years: 17.00     Types: Cigarettes     Smokeless tobacco: Never Used     Alcohol use Yes     Drug use: No     Sexual activity: Yes     Partners: Male     Birth control/ protection: Pill     Other Topics Concern     Parent/Sibling W/ Cabg, Mi Or Angioplasty Before 65f 55m? No     Social History Narrative       Family History   Problem Relation Age of Onset     Allergies Mother      Respiratory Mother      asthma     Hypertension Father        OBJECTIVE:  There were no vitals taken for this visit.    PROCEDURE NOTE:  The patient was properly identifed and placed in the prone position with pillows under lower abdomen to flatten the sacrum and under the shins to allow the toes to dangle free.  A ground pad was placed on the bottom the foot and test stimulator cable connected to the pad and to the external test stimulator, which was adjusted and monitor by the [employer] Ketan cha.  The patient was sterilly prepped and draped; the  "approximate location of both S3 foramen were located and marked with marking pen.  An appropriate amount of local anesthetic, 1% lidocaine with epinephrine, was administerred in both areas.  A foramen needle was placed at a 60 degree angle into the S3 foramen on one side.  Proper S3 needle position was confirmed by patient sensation of stimulation, direct observation of the lifting of the perineum (bellowing) and observation of plantar flexion of the great toe utilizing the external Test Stimulator and j-hook.  The same procedure was then repeated on the contralateral side, with a second foramen needle and localization procedure.    Once the needles were in proper position, the stylets were removed and a percutaneous lead inserted to proper depth using 3.5\" or 5\" lead markers, tested and confirmed for pt response.  The needle was then taken out over the lead, response again verified.  The leads were then connected the the short test Stimulator cables and ground pads.  The entire region was cleaned off and covered with Tegaderm.  EBL: ,5    Using the external Test Stimulator, the patient was programmed to optimum sensation via the lead and given instuctions on utilizing the external Test Stimulator prior to discharge from the clinic.    ASSESSMENT: Overactive bladder   Urgency-frequency syndrome  Pelvic floor dysfunction    PLAN:  The pt will keep a voiding/symptom diary and will notify our office of responses, with the goal of permanent lead placement in the future, if appropriate clinical response is achieved  She has appt for Friday for lead removal    Regine Prasad      CPT code: 66144 (-59 or -51 for multiple leads)    Device codes:  --Lead, neurostimulator test kit                           --sacral nerve stimulation , each (2)    "

## 2018-08-29 LAB
BACTERIA SPEC CULT: NO GROWTH
Lab: NORMAL
SPECIMEN SOURCE: NORMAL

## 2018-08-31 ENCOUNTER — OFFICE VISIT (OUTPATIENT)
Dept: OBGYN | Facility: CLINIC | Age: 33
End: 2018-08-31
Payer: COMMERCIAL

## 2018-08-31 DIAGNOSIS — N32.81 OVERACTIVE BLADDER: Primary | ICD-10-CM

## 2018-08-31 NOTE — NURSING NOTE
"Patient presents to clinic for removal of temporary leads for Interstim trial.  Patient did not bring her diary.  Patient finds it difficult to explain whether she felt it was successful or not.  Patient \" in a hurry\" as she needs to get to work.  Patient able to turn off device before removal of leads.    Tape and leads removed without difficulty.  Skin intact without bruising, bleeding or drainage from sites.  Patient tolerated well.    Patient reports \" I will send Dr. Prasad a message through [employer] to let her know what I think about it.\"    Zainab Jensen   Ob/Gyn Clinic  RN      "

## 2018-08-31 NOTE — MR AVS SNAPSHOT
After Visit Summary   8/31/2018    Britney Marquez    MRN: [id number]           Patient Information     Date Of Birth          1985        Visit Information        Provider Department      8/31/2018 2:45 PM [place], Ob Nurse - [facility]        Today's Diagnoses     Overactive bladder    -  1      Care Instructions    Patient advised to take a photo of her diary and upload through [employer].  Patient will contact Dr. Prasad for further updates.    Zainab Jensen   Ob/Gyn Clinic  RN            Follow-ups after your visit        Who to contact     If you have questions or need follow up information about today's clinic visit or your schedule please contact [facility] directly at [phone].  Normal or non-critical lab and imaging results will be communicated to you by [employer], letter or phone within 4 business days after the clinic has received the results. If you do not hear from us within 7 days, please contact the clinic through [employer] or phone. If you have a critical or abnormal lab result, we will notify you by phone as soon as possible.  Submit refill requests through [employer] or call your pharmacy and they will forward the refill request to us. Please allow 3 business days for your refill to be completed.          Additional Information About Your Visit        WebThriftStore[employer]HobbyTalk Information     [employer] gives you secure access to your electronic health record. If you see a primary care provider, you can also send messages to your care team and make appointments. If you have questions, please call your primary care clinic.  If you do not have a primary care provider, please call [phone] and they will assist you.        Care EveryWhere ID     This is your Care EveryWhere ID. This could be used by other organizations to access your [place] [facility] medical records  [id number]         Blood Pressure from Last 3 Encounters:   08/09/18 108/70   05/04/18 113/73   04/02/18 106/69     Weight from Last 3 Encounters:   08/09/18 124 lb (56.2 kg)   05/04/18 125 lb (56.7 kg)   03/15/18 124 lb (56.2 kg)              Today, you had the following     No orders found for display       Primary Care Provider Office Phone # Fax #    Ivory Hicks [phone]       [address] [facility] [address]        Equal Access to Services     KIRSTEN GUTIÉRREZ : Hadii aad ku hadasho Soomaali, waaxda luqadaha, qaybta kaalmada adeegyada, waxay idiin hayaan adeeg efraín lascarlettstuart . So [facility] [place] [phone].    ATENCIÓN: Si habla espamber, tiene a castro disposición servicios gratuitos de asistencia lingüística. Redame al [phone].    We comply with applicable federal civil rights laws and [place] laws. We do not discriminate on the basis of race, color, national origin, age, disability, sex, sexual orientation, or gender identity.            Thank you!     Thank you for choosing [facility]  for your care. Our goal is always to provide you with excellent care. Hearing back from our patients is one way we can continue to improve our services. Please take a few minutes to complete the written survey that you may receive in the mail after your visit with us. Thank you!             Your Updated Medication List - Protect others around you: Learn how to safely use, store and throw away your medicines at www.disposemymeds.org.          This list is accurate as of 8/31/18  3:17 PM.  Always use your most recent med list.                   Brand Name Dispense Instructions for use Diagnosis    acetaminophen 325 MG tablet    TYLENOL    100 tablet    Take 2 tablets (650 mg) by mouth every 4 hours as needed for other (mild pain)        amitriptyline 10 MG tablet    ELAVIL    90 tablet    1 po QHS    PFD (pelvic floor dysfunction)       cyclobenzaprine 10 MG tablet    FLEXERIL    30 tablet    Take 0.5-1 tablets (5-10 mg) by mouth 3 times daily as needed for muscle spasms    Pelvic floor dysfunction        doxycycline 100 MG capsule    VIBRAMYCIN    60 capsule    Take 1 capsule (100 mg) by mouth 2 times daily    Perioral dermatitis       levocetirizine 5 MG tablet    XYZAL    30 tablet    TAKE ONE TABLET BY MOUTH EVERY EVENING    Allergic rhinitis       norethindrone 5 MG tablet    AYGESTIN    84 tablet    Take 1 tablet (5 mg) by mouth daily    Pelvic floor dysfunction

## 2018-08-31 NOTE — PATIENT INSTRUCTIONS
Patient advised to take a photo of her diary and upload through [employer].  Patient will contact Dr. Prasad for further updates.    Zainab Jensen   Ob/Gyn Clinic  [place]

## 2018-09-05 ENCOUNTER — MYC MEDICAL ADVICE (OUTPATIENT)
Dept: OBGYN | Facility: CLINIC | Age: 33
End: 2018-09-05

## 2018-09-05 DIAGNOSIS — M62.89 PELVIC FLOOR DYSFUNCTION: Primary | ICD-10-CM

## 2018-09-05 RX ORDER — DULOXETIN HYDROCHLORIDE 20 MG/1
20 CAPSULE, DELAYED RELEASE ORAL 2 TIMES DAILY
Qty: 60 CAPSULE | Refills: 1 | Status: SHIPPED | OUTPATIENT
Start: 2018-09-05 | End: 2019-06-07

## 2018-10-03 ENCOUNTER — MYC MEDICAL ADVICE (OUTPATIENT)
Dept: FAMILY MEDICINE | Facility: CLINIC | Age: 33
End: 2018-10-03

## 2018-10-10 ENCOUNTER — MYC MEDICAL ADVICE (OUTPATIENT)
Dept: OBGYN | Facility: CLINIC | Age: 33
End: 2018-10-10

## 2018-10-10 DIAGNOSIS — M53.3 SACRAL PAIN: Primary | ICD-10-CM

## 2018-10-10 NOTE — TELEPHONE ENCOUNTER
Order xray of sacrum re: sacral pain   Regine Prasad MD   [facility] (Routing comment)     Ordered.    Zainab Jensen   Ob/Gyn Clinic  RN

## 2018-10-16 ENCOUNTER — RADIANT APPOINTMENT (OUTPATIENT)
Dept: GENERAL RADIOLOGY | Facility: CLINIC | Age: 33
End: 2018-10-16
Attending: OBSTETRICS & GYNECOLOGY
Payer: COMMERCIAL

## 2018-10-16 DIAGNOSIS — M53.3 SACRAL PAIN: ICD-10-CM

## 2018-10-16 PROCEDURE — 72220 X-RAY EXAM SACRUM TAILBONE: CPT | Mod: FY

## 2019-01-11 ENCOUNTER — MYC REFILL (OUTPATIENT)
Dept: OBGYN | Facility: CLINIC | Age: 34
End: 2019-01-11

## 2019-01-11 ENCOUNTER — MYC REFILL (OUTPATIENT)
Dept: FAMILY MEDICINE | Facility: CLINIC | Age: 34
End: 2019-01-11

## 2019-01-11 DIAGNOSIS — M62.89 PELVIC FLOOR DYSFUNCTION: ICD-10-CM

## 2019-01-11 DIAGNOSIS — J30.9 ALLERGIC RHINITIS: ICD-10-CM

## 2019-01-13 NOTE — TELEPHONE ENCOUNTER
"Requested Prescriptions   Pending Prescriptions Disp Refills     levocetirizine (XYZAL) 5 MG tablet  Last Written Prescription Date:  7/20/18  Last Fill Quantity: 30,  # refills: 1   Last office visit: 3/15/2018 with prescribing provider:  AAMIR Hicks   Future Office Visit:     30 tablet 1     Sig: Take 1 tablet (5 mg) by mouth every morning    Antihistamines Protocol Passed - 1/11/2019  7:36 PM       Passed - Patient is 3-64 years of age    Apply weight-based dosing for peds patients age 3 - 12 years of age.    Forward request to provider for patients under the age of 3 or over the age of 64.         Passed - Recent (12 mo) or future (30 days) visit within the authorizing provider's specialty    Patient had office visit in the last 12 months or has a visit in the next 30 days with authorizing provider or within the authorizing provider's specialty.  See \"Patient Info\" tab in inbasket, or \"Choose Columns\" in Meds & Orders section of the refill encounter.             Passed - Medication is active on med list          "

## 2019-01-14 RX ORDER — LEVOCETIRIZINE DIHYDROCHLORIDE 5 MG/1
5 TABLET, FILM COATED ORAL EVERY MORNING
Qty: 30 TABLET | Refills: 3 | Status: SHIPPED | OUTPATIENT
Start: 2019-01-14 | End: 2019-06-07

## 2019-01-14 RX ORDER — CYCLOBENZAPRINE HCL 10 MG
5-10 TABLET ORAL 3 TIMES DAILY PRN
Qty: 30 TABLET | Refills: 1 | Status: SHIPPED | OUTPATIENT
Start: 2019-01-14 | End: 2019-06-07

## 2019-04-04 ENCOUNTER — OFFICE VISIT (OUTPATIENT)
Dept: FAMILY MEDICINE | Facility: CLINIC | Age: 34
End: 2019-04-04
Payer: COMMERCIAL

## 2019-04-04 VITALS
TEMPERATURE: 98.3 F | BODY MASS INDEX: 23.92 KG/M2 | DIASTOLIC BLOOD PRESSURE: 72 MMHG | WEIGHT: 135 LBS | SYSTOLIC BLOOD PRESSURE: 120 MMHG | OXYGEN SATURATION: 100 % | RESPIRATION RATE: 16 BRPM | HEIGHT: 63 IN

## 2019-04-04 DIAGNOSIS — B07.0 PLANTAR WART: Primary | ICD-10-CM

## 2019-04-04 PROCEDURE — 17110 DESTRUCTION B9 LES UP TO 14: CPT | Performed by: FAMILY MEDICINE

## 2019-04-04 ASSESSMENT — MIFFLIN-ST. JEOR: SCORE: 1268.55

## 2019-04-04 NOTE — PROGRESS NOTES
"  SUBJECTIVE:   Britney Marquez is a 33 year old female who presents to clinic today for the following health issues:      Wart treatment  One on both feet  Has been there awhile, has tried over the counter products without success.     Problem list and histories reviewed & adjusted, as indicated.  Additional history: as documented    Reviewed and updated as needed this visit by clinical staff  Tobacco  Allergies  Meds  Med Hx  Surg Hx  Fam Hx  Soc Hx      Reviewed and updated as needed this visit by Provider       OBJECTIVE: /72 (BP Location: Right arm)   Temp 98.3  F (36.8  C) (Tympanic)   Resp 16   Ht 1.588 m (5' 2.5\")   Wt 61.2 kg (135 lb)   SpO2 100%   BMI 24.30 kg/m     General: appears well, in no distress   Skin: 3 mm wart right medial heel, and 3 mm wart on left ball of foot    ASSESSMENT:  1. Plantar wart        PLAN:  Cryotherapy x 2 applied to two warts      Patient Instructions   [place] in 3 weeks or so if neccessary        Ivory Hayward MD   "

## 2019-04-04 NOTE — NURSING NOTE
"Chief Complaint   Patient presents with     Wart     warts on both feet       Initial /72 (BP Location: Right arm)   Temp 98.3  F (36.8  C) (Tympanic)   Resp 16   Ht 1.588 m (5' 2.5\")   Wt 61.2 kg (135 lb)   SpO2 100%   BMI 24.30 kg/m   Estimated body mass index is 24.3 kg/m  as calculated from the following:    Height as of this encounter: 1.588 m (5' 2.5\").    Weight as of this encounter: 61.2 kg (135 lb).    Patient presents to the clinic using No DME    Health Maintenance that is potentially due pending provider review:  NONE    n/a    Is there anyone who you would like to be able to receive your results? No  If yes have patient fill out PENNY    "

## 2019-06-07 ENCOUNTER — OFFICE VISIT (OUTPATIENT)
Dept: FAMILY MEDICINE | Facility: CLINIC | Age: 34
End: 2019-06-07
Payer: COMMERCIAL

## 2019-06-07 VITALS
OXYGEN SATURATION: 100 % | TEMPERATURE: 98.3 F | BODY MASS INDEX: 23.4 KG/M2 | SYSTOLIC BLOOD PRESSURE: 108 MMHG | HEART RATE: 85 BPM | DIASTOLIC BLOOD PRESSURE: 78 MMHG | WEIGHT: 130 LBS

## 2019-06-07 DIAGNOSIS — J30.2 SEASONAL ALLERGIC RHINITIS, UNSPECIFIED TRIGGER: ICD-10-CM

## 2019-06-07 DIAGNOSIS — B07.0 PLANTAR WART: Primary | ICD-10-CM

## 2019-06-07 PROCEDURE — 17110 DESTRUCTION B9 LES UP TO 14: CPT | Performed by: FAMILY MEDICINE

## 2019-06-07 RX ORDER — LORATADINE 10 MG/1
10 TABLET ORAL DAILY
Qty: 90 TABLET | Refills: 1 | Status: SHIPPED | OUTPATIENT
Start: 2019-06-07 | End: 2019-12-20

## 2019-06-07 NOTE — NURSING NOTE
"Chief Complaint   Patient presents with     Wart     left foot       Initial /78 (BP Location: Right arm)   Pulse 85   Temp 98.3  F (36.8  C) (Tympanic)   Wt 59 kg (130 lb)   SpO2 100%   BMI 23.40 kg/m   Estimated body mass index is 23.4 kg/m  as calculated from the following:    Height as of 4/4/19: 1.588 m (5' 2.5\").    Weight as of this encounter: 59 kg (130 lb).    Patient presents to the clinic using No DME    Health Maintenance that is potentially due pending provider review:  NONE    n/a    Is there anyone who you would like to be able to receive your results? No  If yes have patient fill out PENNY    "

## 2019-06-07 NOTE — PROGRESS NOTES
Subjective     Britney Marquez is a 33 year old female who presents to clinic today for the following health issues:    HPI   WART(S)      Onset: recheck    Description (location/number): left foot    Accompanying signs and symptoms: Painful: no    History: prior warts: YES    Therapies tried and outcome: freeze    On on medial side of right foot, ball of left foot  I treated the left plantar wart some time ago, got black and slowly came back    Reviewed and updated as needed this visit by Provider             Objective    /78 (BP Location: Right arm)   Pulse 85   Temp 98.3  F (36.8  C) (Tympanic)   Wt 59 kg (130 lb)   LMP  (LMP Unknown)   SpO2 100%   BMI 23.40 kg/m    Body mass index is 23.4 kg/m .  Physical Exam   General: appears well, in no distress   Skin: 3 mm wart medial side of right heel, 4 mm wart ball of left plantar foot    ASSESSMENT:  1. Plantar wart    2. Seasonal allergic rhinitis, unspecified trigger        PLAN:  Orders Placed This Encounter     loratadine (CLARITIN) 10 MG tablet     Cryotherapy applied to both warts x 2    Patient Instructions   [place] 3 weeks       Ivory Hayward MD

## 2019-07-02 ENCOUNTER — OFFICE VISIT (OUTPATIENT)
Dept: FAMILY MEDICINE | Facility: CLINIC | Age: 34
End: 2019-07-02
Payer: COMMERCIAL

## 2019-07-02 VITALS
HEIGHT: 63 IN | OXYGEN SATURATION: 98 % | SYSTOLIC BLOOD PRESSURE: 118 MMHG | BODY MASS INDEX: 23.57 KG/M2 | HEART RATE: 81 BPM | RESPIRATION RATE: 16 BRPM | WEIGHT: 133 LBS | DIASTOLIC BLOOD PRESSURE: 80 MMHG | TEMPERATURE: 98.5 F

## 2019-07-02 DIAGNOSIS — B07.0 PLANTAR WART: Primary | ICD-10-CM

## 2019-07-02 PROCEDURE — 17110 DESTRUCTION B9 LES UP TO 14: CPT | Performed by: FAMILY MEDICINE

## 2019-07-02 ASSESSMENT — MIFFLIN-ST. JEOR: SCORE: 1259.47

## 2019-07-02 NOTE — PROGRESS NOTES
"Subjective     Britney Marquez is a 33 year old female who presents to clinic today for the following health issues:    HPI   WART(S)      Onset: on going    Description (location/number): both feet    Accompanying signs and symptoms: Painful: no    History: prior warts: YES    Therapies tried and outcome: cryo    One wart on left plantar surface got black, the one on right foot arch didn't change much.     Reviewed and updated as needed this visit by Provider  Tobacco  Allergies  Meds  Problems  Med Hx  Surg Hx  Fam Hx             Objective    /80 (BP Location: Right arm)   Pulse 81   Temp 98.5  F (36.9  C) (Tympanic)   Resp 16   Ht 1.588 m (5' 2.5\")   Wt 60.3 kg (133 lb)   LMP 07/02/2018   SpO2 98%   BMI 23.94 kg/m    Body mass index is 23.94 kg/m .  Physical Exam   General: appears well, in no distress   Skin: 3 mm wart medial side of right heel, 4 mm wart ball of left plantar foot    ASSESSMENT:  1. Plantar wart        PLAN:  Cryotherapy x 2 applied to 2 warts    [place] as needed    Ivory Hayward MD  "

## 2019-07-02 NOTE — NURSING NOTE
"Chief Complaint   Patient presents with     Wart     both feet       Initial /80 (BP Location: Right arm)   Pulse 81   Temp 98.5  F (36.9  C) (Tympanic)   Resp 16   Ht 1.588 m (5' 2.5\")   Wt 60.3 kg (133 lb)   LMP 07/02/2018   SpO2 98%   BMI 23.94 kg/m   Estimated body mass index is 23.94 kg/m  as calculated from the following:    Height as of this encounter: 1.588 m (5' 2.5\").    Weight as of this encounter: 60.3 kg (133 lb).    Patient presents to the clinic using No DME    Health Maintenance that is potentially due pending provider review:  NONE    n/a    Is there anyone who you would like to be able to receive your results? No  If yes have patient fill out PENNY    "

## 2019-10-23 ENCOUNTER — OFFICE VISIT (OUTPATIENT)
Dept: DERMATOLOGY | Facility: CLINIC | Age: 34
End: 2019-10-23
Attending: FAMILY MEDICINE
Payer: COMMERCIAL

## 2019-10-23 VITALS — SYSTOLIC BLOOD PRESSURE: 104 MMHG | DIASTOLIC BLOOD PRESSURE: 75 MMHG | HEART RATE: 71 BPM | OXYGEN SATURATION: 98 %

## 2019-10-23 DIAGNOSIS — B07.9 VIRAL WARTS, UNSPECIFIED TYPE: ICD-10-CM

## 2019-10-23 DIAGNOSIS — D18.01 HEMANGIOMA OF SKIN: Primary | ICD-10-CM

## 2019-10-23 PROCEDURE — 99243 OFF/OP CNSLTJ NEW/EST LOW 30: CPT | Mod: 25 | Performed by: DERMATOLOGY

## 2019-10-23 PROCEDURE — 17110 DESTRUCTION B9 LES UP TO 14: CPT | Performed by: DERMATOLOGY

## 2019-10-23 NOTE — PROGRESS NOTES
Britney Marquez , a 34 year old year old female patient, I was asked to see by Dr. Hicks for spots on feet.  Patient states this has been present for a while.  Patient reports the following symptoms:  tender .  Patient reports the following previous treatments cryo.  Patient reports the following modifying factors none.  Associated symptoms: none.  Patient has no other skin complaints today.  Remainder of the HPI, Meds, PMH, Allergies, FH, and SH was reviewed in chart.      Past Medical History:   Diagnosis Date     Mental disorders of mother, postpartum      Papanicolaou smear of cervix with atypical squamous cells of undetermined significance (ASC-US) 2001     S/P LEEP of cervix 2002    ECC revealing focal ALLEY-3- repeat pap in 2002 was normal (pt was less than 20 yrs of age)       Past Surgical History:   Procedure Laterality Date     C  DELIVERY ONLY  3/2/2003    Low transverse  section -Breech-     LAPAROSCOPIC TUBAL LIGATION Bilateral 2018    Procedure: LAPAROSCOPIC TUBAL LIGATION;  Laparoscopic bilateral tubal ligation;  Surgeon: Kina Lujan MD;  Location: [facility] OR     SURGICAL HISTORY OF -       LEEP ECC Showed HPV effect     SURGICAL HISTORY OF -       EEC ALLEY-III  pregnacy repeat pap        Family History   Problem Relation Age of Onset     Allergies Mother      Respiratory Mother         asthma     Hypertension Father      Cancer Maternal Grandfather         skin cancer       Social History     Socioeconomic History     Marital status: Single     Spouse name: Not on file     Number of children: Not on file     Years of education: Not on file     Highest education level: Not on file   Occupational History     Not on file   Social Needs     Financial resource strain: Not on file     Food insecurity:     Worry: Not on file     Inability: Not on file     Transportation needs:     Medical: Not on file     Non-medical: Not on file   Tobacco Use     Smoking status:  Current Every Day Smoker     Packs/day: 0.25     Years: 17.00     Pack years: 4.25     Types: Cigarettes     Smokeless tobacco: Never Used   Substance and Sexual Activity     Alcohol use: Yes     Drug use: No     Sexual activity: Yes     Partners: Male     Birth control/protection: Pill   Lifestyle     Physical activity:     Days per week: Not on file     Minutes per session: Not on file     Stress: Not on file   Relationships     Social connections:     Talks on phone: Not on file     Gets together: Not on file     Attends Confucianist service: Not on file     Active member of club or organization: Not on file     Attends meetings of clubs or organizations: Not on file     Relationship status: Not on file     Intimate partner violence:     Fear of current or ex partner: Not on file     Emotionally abused: Not on file     Physically abused: Not on file     Forced sexual activity: Not on file   Other Topics Concern     Parent/sibling w/ CABG, MI or angioplasty before 65F 55M? No   Social History Narrative     Not on file       Outpatient Encounter Medications as of 10/23/2019   Medication Sig Dispense Refill     loratadine (CLARITIN) 10 MG tablet Take 1 tablet (10 mg) by mouth daily 90 tablet 1     doxycycline (VIBRAMYCIN) 100 MG capsule Take 1 capsule (100 mg) by mouth 2 times daily 60 capsule 1     norethindrone (AYGESTIN) 5 MG tablet Take 1 tablet (5 mg) by mouth daily (Patient not taking: Reported on 10/23/2019) 84 tablet 3     No facility-administered encounter medications on file as of 10/23/2019.              Review Of Systems  Skin: As above  Eyes: negative  Ears/Nose/Throat: negative  Respiratory: No shortness of breath, dyspnea on exertion, cough, or hemoptysis  Cardiovascular: negative  Gastrointestinal: negative  Genitourinary: negative  Musculoskeletal: negative  Neurologic: negative  Psychiatric: negative  Hematologic/Lymphatic/Immunologic: negative  Endocrine: negative      O:   NAD, WDWN, Alert &  Oriented, Mood & Affect wnl, Vitals stable   Here today alone   /75 (BP Location: Left arm, Patient Position: Sitting, Cuff Size: Adult Regular)   Pulse 71   SpO2 98%    General appearance alone   Vitals leena ii   Alert, oriented and in no acute distress   Red papule son legs   R heel verrucou spapule   L plantar foot verrucous papule     The remainder of expanded problem focused exam was unremarkable; the following areas were examined:  scalp/hair, conjunctiva/lids, face, neck, lips, chest, digits/nails, RUE, LUE.      Eyes: Conjunctivae/lids:Normal     ENT: Lips, buccal mucosa, tongue: normal    MSK:Normal    Cardiovascular: peripheral edema none    Pulm: Breathing Normal    Neuro/Psych: Orientation:Normal; Mood/Affect:Normal      A/P:  1. ANGIOMA  2. WART  MEDIPLAST, CRYO, LASER, CANDIN, BLEO DW   tWO LESIONS  TANGENTIAL EXCISION:  After consent, anesthesia with LEC and prep, tangential excision performed.  No complications and routine wound care.    IL Candin: PGACAC discussed.  Risks including but not limited to injection site reaction, bruising, no resolution.  All questions answered and entertained to patient s satisfaction.  Informed consent obtained.  IL Candin in concentration of 1 unit/ 0.1 ml was injected ID to R heel and L foot.  Total injected was  0.2 units.  Patient tolerated without complications and given wound care instructions, including not to move product around.  Return in 4 weeks for follow-up and possible additional IL Candin.    mediplast tape at home  Lot [id number]  5/16/2021    BENIGN LESIONS DISCUSSED WITH PATIENT:  I discussed the specifics of tumor, prognosis, and genetics of benign lesions.  I explained that treatment of these lesions would be purely cosmetic and not medically neccessary.  I discussed with patient different removal options including excision, cautery and /or laser.      Nature and genetics of benign skin lesions dicussed with patient.  Signs and Symptoms of skin  cancer discussed with patient.    Skin care regimen reviewed with patient: Eliminate harsh soaps, i.e. Dial, zest, irsih spring; Mild soaps such as Cetaphil or Dove sensitive skin, avoid hot or cold showers, aggressive use of emollients including vanicream, cetaphil or cerave discussed with patient.

## 2019-10-23 NOTE — PATIENT INSTRUCTIONS
We recommend  Mediplast Tape over-the-counter   Directions: Mediplast tape: Cut to size of wart, leave tape on for 2 days, (Put duct tape over it to secure it). In 2 days, pare skin down with a pumice stone and repeat. You want to pare down until you see some pinpoint bleeding. Do this for 6-8 weeks, if no improvement, make follow up appt.     Return in 4 weeks.

## 2019-10-23 NOTE — NURSING NOTE
"Initial /75 (BP Location: Left arm, Patient Position: Sitting, Cuff Size: Adult Regular)   Pulse 71   SpO2 98%  Estimated body mass index is 23.94 kg/m  as calculated from the following:    Height as of 7/2/19: 1.588 m (5' 2.5\").    Weight as of 7/2/19: 60.3 kg (133 lb). .      "

## 2019-10-23 NOTE — LETTER
10/23/2019         RE: Britney Marquez  [address]  [address]  [facility] [address]        Dear Colleague,    Thank you for referring your patient, Britney Marquez, to the [facility]. Please see a copy of my visit note below.    Britney Marquez , a 34 year old year old female patient, I was asked to see by Dr. Hicks for spots on feet.  Patient states this has been present for a while.  Patient reports the following symptoms:  tender .  Patient reports the following previous treatments cryo.  Patient reports the following modifying factors none.  Associated symptoms: none.  Patient has no other skin complaints today.  Remainder of the HPI, Meds, PMH, Allergies, FH, and SH was reviewed in chart.      Past Medical History:   Diagnosis Date     Mental disorders of mother, postpartum      Papanicolaou smear of cervix with atypical squamous cells of undetermined significance (ASC-US) 2001     S/P LEEP of cervix 2002    ECC revealing focal ALLEY-3- repeat pap in 2002 was normal (pt was less than 20 yrs of age)       Past Surgical History:   Procedure Laterality Date     C  DELIVERY ONLY  3/2/2003    Low transverse  section -Breech-     LAPAROSCOPIC TUBAL LIGATION Bilateral 2018    Procedure: LAPAROSCOPIC TUBAL LIGATION;  Laparoscopic bilateral tubal ligation;  Surgeon: Kina Lujan MD;  Location: [facility] OR     SURGICAL HISTORY OF -       LEEP ECC Showed HPV effect     SURGICAL HISTORY OF -       EEC ALLEY-III  pregnacy repeat pap        Family History   Problem Relation Age of Onset     Allergies Mother      Respiratory Mother         asthma     Hypertension Father      Cancer Maternal Grandfather         skin cancer       Social History     Socioeconomic History     Marital status: Single     Spouse name: Not on file     Number of children: Not on file     Years of education: Not on file     Highest education level: Not on file   Occupational  History     Not on file   Social Needs     Financial resource strain: Not on file     Food insecurity:     Worry: Not on file     Inability: Not on file     Transportation needs:     Medical: Not on file     Non-medical: Not on file   Tobacco Use     Smoking status: Current Every Day Smoker     Packs/day: 0.25     Years: 17.00     Pack years: 4.25     Types: Cigarettes     Smokeless tobacco: Never Used   Substance and Sexual Activity     Alcohol use: Yes     Drug use: No     Sexual activity: Yes     Partners: Male     Birth control/protection: Pill   Lifestyle     Physical activity:     Days per week: Not on file     Minutes per session: Not on file     Stress: Not on file   Relationships     Social connections:     Talks on phone: Not on file     Gets together: Not on file     Attends Mormon service: Not on file     Active member of club or organization: Not on file     Attends meetings of clubs or organizations: Not on file     Relationship status: Not on file     Intimate partner violence:     Fear of current or ex partner: Not on file     Emotionally abused: Not on file     Physically abused: Not on file     Forced sexual activity: Not on file   Other Topics Concern     Parent/sibling w/ CABG, MI or angioplasty before 65F 55M? No   Social History Narrative     Not on file       Outpatient Encounter Medications as of 10/23/2019   Medication Sig Dispense Refill     loratadine (CLARITIN) 10 MG tablet Take 1 tablet (10 mg) by mouth daily 90 tablet 1     doxycycline (VIBRAMYCIN) 100 MG capsule Take 1 capsule (100 mg) by mouth 2 times daily 60 capsule 1     norethindrone (AYGESTIN) 5 MG tablet Take 1 tablet (5 mg) by mouth daily (Patient not taking: Reported on 10/23/2019) 84 tablet 3     No facility-administered encounter medications on file as of 10/23/2019.              Review Of Systems  Skin: As above  Eyes: negative  Ears/Nose/Throat: negative  Respiratory: No shortness of breath, dyspnea on exertion, cough,  or hemoptysis  Cardiovascular: negative  Gastrointestinal: negative  Genitourinary: negative  Musculoskeletal: negative  Neurologic: negative  Psychiatric: negative  Hematologic/Lymphatic/Immunologic: negative  Endocrine: negative      O:   NAD, WDWN, Alert & Oriented, Mood & Affect wnl, Vitals stable   Here today alone   /75 (BP Location: Left arm, Patient Position: Sitting, Cuff Size: Adult Regular)   Pulse 71   SpO2 98%    General appearance alone   Vitals leena ii   Alert, oriented and in no acute distress   Red papule son legs   R heel verrucou spapule   L plantar foot verrucous papule     The remainder of expanded problem focused exam was unremarkable; the following areas were examined:  scalp/hair, conjunctiva/lids, face, neck, lips, chest, digits/nails, RUE, LUE.      Eyes: Conjunctivae/lids:Normal     ENT: Lips, buccal mucosa, tongue: normal    MSK:Normal    Cardiovascular: peripheral edema none    Pulm: Breathing Normal    Neuro/Psych: Orientation:Normal; Mood/Affect:Normal      A/P:  1. ANGIOMA  2. WART  MEDIPLAST, CRYO, LASER, CANDIN, BLEO DW   tWO LESIONS  TANGENTIAL EXCISION:  After consent, anesthesia with LEC and prep, tangential excision performed.  No complications and routine wound care.    IL Candin: PGACAC discussed.  Risks including but not limited to injection site reaction, bruising, no resolution.  All questions answered and entertained to patient s satisfaction.  Informed consent obtained.  IL Candin in concentration of 1 unit/ 0.1 ml was injected ID to R heel and L foot.  Total injected was  0.2 units.  Patient tolerated without complications and given wound care instructions, including not to move product around.  Return in 4 weeks for follow-up and possible additional IL Candin.    mediplast tape at home      BENIGN LESIONS DISCUSSED WITH PATIENT:  I discussed the specifics of tumor, prognosis, and genetics of benign lesions.  I explained that treatment of these lesions would be  purely cosmetic and not medically neccessary.  I discussed with patient different removal options including excision, cautery and /or laser.      Nature and genetics of benign skin lesions dicussed with patient.  Signs and Symptoms of skin cancer discussed with patient.    Skin care regimen reviewed with patient: Eliminate harsh soaps, i.e. Dial, zest, irsih spring; Mild soaps such as Cetaphil or Dove sensitive skin, avoid hot or cold showers, aggressive use of emollients including vanicream, cetaphil or cerave discussed with patient.        Again, thank you for allowing me to participate in the care of your patient.        Sincerely,        Carols Corley MD

## 2019-11-03 ENCOUNTER — HEALTH MAINTENANCE LETTER (OUTPATIENT)
Age: 34
End: 2019-11-03

## 2019-11-20 ENCOUNTER — OFFICE VISIT (OUTPATIENT)
Dept: DERMATOLOGY | Facility: CLINIC | Age: 34
End: 2019-11-20
Payer: COMMERCIAL

## 2019-11-20 VITALS — HEART RATE: 75 BPM | DIASTOLIC BLOOD PRESSURE: 68 MMHG | SYSTOLIC BLOOD PRESSURE: 98 MMHG | OXYGEN SATURATION: 98 %

## 2019-11-20 DIAGNOSIS — B07.8 COMMON WART: Primary | ICD-10-CM

## 2019-11-20 PROCEDURE — 17110 DESTRUCTION B9 LES UP TO 14: CPT | Performed by: DERMATOLOGY

## 2019-11-20 NOTE — NURSING NOTE
Chief Complaint   Patient presents with     Derm Problem     fu warts       Vitals:    11/20/19 1151   BP: 98/68   Pulse: 75   SpO2: 98%     Wt Readings from Last 1 Encounters:   07/02/19 60.3 kg (133 lb)       Bri Osborn LPN.................11/20/2019

## 2019-11-20 NOTE — LETTER
2019         RE: Britney Marquez  [address]  [address]  [facility] [address]        Dear Colleague,    Thank you for referring your patient, Britney Marquez, to the [facility]. Please see a copy of my visit note below.    Britney Marquez is a 34 year old year old female patient here today for wartsa on feet, getting smaller with mediaplast.  Patient reports the following modifying factors none.  Associated symptoms: none.  Patient has no other skin complaints today.  Remainder of the HPI, Meds, PMH, Allergies, FH, and SH was reviewed in chart.      Past Medical History:   Diagnosis Date     Mental disorders of mother, postpartum      Papanicolaou smear of cervix with atypical squamous cells of undetermined significance (ASC-US) 2001     S/P LEEP of cervix 2002    ECC revealing focal ALLEY-3- repeat pap in 2002 was normal (pt was less than 20 yrs of age)       Past Surgical History:   Procedure Laterality Date     C  DELIVERY ONLY  3/2/2003    Low transverse  section -Breech-     LAPAROSCOPIC TUBAL LIGATION Bilateral 2018    Procedure: LAPAROSCOPIC TUBAL LIGATION;  Laparoscopic bilateral tubal ligation;  Surgeon: Kina Lujan MD;  Location: [facility] OR     SURGICAL HISTORY OF -       LEEP ECC Showed HPV effect     SURGICAL HISTORY OF -       EEC ALLEY-III  pregnacy repeat pap        Family History   Problem Relation Age of Onset     Allergies Mother      Respiratory Mother         asthma     Hypertension Father      Cancer Maternal Grandfather         skin cancer       Social History     Socioeconomic History     Marital status: Single     Spouse name: Not on file     Number of children: Not on file     Years of education: Not on file     Highest education level: Not on file   Occupational History     Not on file   Social Needs     Financial resource strain: Not on file     Food insecurity:     Worry: Not on file     Inability: Not on  file     Transportation needs:     Medical: Not on file     Non-medical: Not on file   Tobacco Use     Smoking status: Current Every Day Smoker     Packs/day: 0.25     Years: 17.00     Pack years: 4.25     Types: Cigarettes     Smokeless tobacco: Never Used   Substance and Sexual Activity     Alcohol use: Yes     Drug use: No     Sexual activity: Yes     Partners: Male     Birth control/protection: Pill   Lifestyle     Physical activity:     Days per week: Not on file     Minutes per session: Not on file     Stress: Not on file   Relationships     Social connections:     Talks on phone: Not on file     Gets together: Not on file     Attends Alevism service: Not on file     Active member of club or organization: Not on file     Attends meetings of clubs or organizations: Not on file     Relationship status: Not on file     Intimate partner violence:     Fear of current or ex partner: Not on file     Emotionally abused: Not on file     Physically abused: Not on file     Forced sexual activity: Not on file   Other Topics Concern     Parent/sibling w/ CABG, MI or angioplasty before 65F 55M? No   Social History Narrative     Not on file       Outpatient Encounter Medications as of 11/20/2019   Medication Sig Dispense Refill     doxycycline (VIBRAMYCIN) 100 MG capsule Take 1 capsule (100 mg) by mouth 2 times daily 60 capsule 1     loratadine (CLARITIN) 10 MG tablet Take 1 tablet (10 mg) by mouth daily 90 tablet 1     norethindrone (AYGESTIN) 5 MG tablet Take 1 tablet (5 mg) by mouth daily (Patient not taking: Reported on 10/23/2019) 84 tablet 3     No facility-administered encounter medications on file as of 11/20/2019.              Review Of Systems  Skin: As above  Eyes: negative  Ears/Nose/Throat: negative  Respiratory: No shortness of breath, dyspnea on exertion, cough, or hemoptysis  Cardiovascular: negative  Gastrointestinal: negative  Genitourinary: negative  Musculoskeletal: negative  Neurologic:  negative  Psychiatric: negative  Hematologic/Lymphatic/Immunologic: negative  Endocrine: negative      O:   NAD, WDWN, Alert & Oriented, Mood & Affect wnl, Vitals stable   Here today alone   BP 98/68   Pulse 75   SpO2 98%    General appearance normal   Vitals stable   Alert, oriented and in no acute distress     R and L foot shrinking verrucous papule  s    Eyes: Conjunctivae/lids:Normal     ENT: Lips, buccal mucosa, tongue: normal    MSK:Normal    Cardiovascular: peripheral edema none    Pulm: Breathing Normal    Neuro/Psych: Orientation:Normal; Mood/Affect:Normal      A/P:  1. Warts x2  TANGENTIAL EXCISION:  After consent, anesthesia with LEC and prep, tangential excision performed.  No complications and routine wound care.    LN2:  Treated with LN2 for 5s for 1-2 cycles. Warned risks of blistering, pain, pigment change, scarring, and incomplete resolution.  Advised patient to return if lesions do not completely resolve.  Wound care sheet given.  Return to clinic 6 weeks      Again, thank you for allowing me to participate in the care of your patient.        Sincerely,        Carlos Corley MD

## 2019-11-20 NOTE — PROGRESS NOTES
Britney Marquez is a 34 year old year old female patient here today for wartsa on feet, getting smaller with mediaplast.  Patient reports the following modifying factors none.  Associated symptoms: none.  Patient has no other skin complaints today.  Remainder of the HPI, Meds, PMH, Allergies, FH, and SH was reviewed in chart.      Past Medical History:   Diagnosis Date     Mental disorders of mother, postpartum      Papanicolaou smear of cervix with atypical squamous cells of undetermined significance (ASC-US) 2001     S/P LEEP of cervix 2002    ECC revealing focal ALLEY-3- repeat pap in 2002 was normal (pt was less than 20 yrs of age)       Past Surgical History:   Procedure Laterality Date     C  DELIVERY ONLY  3/2/2003    Low transverse  section -Breech-     LAPAROSCOPIC TUBAL LIGATION Bilateral 2018    Procedure: LAPAROSCOPIC TUBAL LIGATION;  Laparoscopic bilateral tubal ligation;  Surgeon: Kina Lujan MD;  Location: [facility]     SURGICAL HISTORY OF -       LEEP ECC Showed HPV effect     SURGICAL HISTORY OF -       EEC ALLEY-III  pregnacy repeat pap        Family History   Problem Relation Age of Onset     Allergies Mother      Respiratory Mother         asthma     Hypertension Father      Cancer Maternal Grandfather         skin cancer       Social History     Socioeconomic History     Marital status: Single     Spouse name: Not on file     Number of children: Not on file     Years of education: Not on file     Highest education level: Not on file   Occupational History     Not on file   Social Needs     Financial resource strain: Not on file     Food insecurity:     Worry: Not on file     Inability: Not on file     Transportation needs:     Medical: Not on file     Non-medical: Not on file   Tobacco Use     Smoking status: Current Every Day Smoker     Packs/day: 0.25     Years: 17.00     Pack years: 4.25     Types: Cigarettes     Smokeless tobacco: Never Used    Substance and Sexual Activity     Alcohol use: Yes     Drug use: No     Sexual activity: Yes     Partners: Male     Birth control/protection: Pill   Lifestyle     Physical activity:     Days per week: Not on file     Minutes per session: Not on file     Stress: Not on file   Relationships     Social connections:     Talks on phone: Not on file     Gets together: Not on file     Attends Protestant service: Not on file     Active member of club or organization: Not on file     Attends meetings of clubs or organizations: Not on file     Relationship status: Not on file     Intimate partner violence:     Fear of current or ex partner: Not on file     Emotionally abused: Not on file     Physically abused: Not on file     Forced sexual activity: Not on file   Other Topics Concern     Parent/sibling w/ CABG, MI or angioplasty before 65F 55M? No   Social History Narrative     Not on file       Outpatient Encounter Medications as of 11/20/2019   Medication Sig Dispense Refill     doxycycline (VIBRAMYCIN) 100 MG capsule Take 1 capsule (100 mg) by mouth 2 times daily 60 capsule 1     loratadine (CLARITIN) 10 MG tablet Take 1 tablet (10 mg) by mouth daily 90 tablet 1     norethindrone (AYGESTIN) 5 MG tablet Take 1 tablet (5 mg) by mouth daily (Patient not taking: Reported on 10/23/2019) 84 tablet 3     No facility-administered encounter medications on file as of 11/20/2019.              Review Of Systems  Skin: As above  Eyes: negative  Ears/Nose/Throat: negative  Respiratory: No shortness of breath, dyspnea on exertion, cough, or hemoptysis  Cardiovascular: negative  Gastrointestinal: negative  Genitourinary: negative  Musculoskeletal: negative  Neurologic: negative  Psychiatric: negative  Hematologic/Lymphatic/Immunologic: negative  Endocrine: negative      O:   NAD, WDWN, Alert & Oriented, Mood & Affect wnl, Vitals stable   Here today alone   BP 98/68   Pulse 75   SpO2 98%    General appearance normal   Vitals  stable   Alert, oriented and in no acute distress     R and L foot shrinking verrucous papule  s    Eyes: Conjunctivae/lids:Normal     ENT: Lips, buccal mucosa, tongue: normal    MSK:Normal    Cardiovascular: peripheral edema none    Pulm: Breathing Normal    Neuro/Psych: Orientation:Normal; Mood/Affect:Normal      A/P:  1. Warts x2  TANGENTIAL EXCISION:  After consent, anesthesia with LEC and prep, tangential excision performed.  No complications and routine wound care.    LN2:  Treated with LN2 for 5s for 1-2 cycles. Warned risks of blistering, pain, pigment change, scarring, and incomplete resolution.  Advised patient to return if lesions do not completely resolve.  Wound care sheet given.  Return to clinic 6 weeks

## 2020-03-24 ENCOUNTER — VIRTUAL VISIT (OUTPATIENT)
Dept: FAMILY MEDICINE | Facility: CLINIC | Age: 35
End: 2020-03-24
Payer: COMMERCIAL

## 2020-03-24 DIAGNOSIS — R10.9 ABDOMINAL PAIN WITH RADIATION TO BACK: Primary | ICD-10-CM

## 2020-03-24 PROCEDURE — 99441 ZZC PHYSICIAN TELEPHONE EVALUATION 5-10 MIN: CPT | Performed by: FAMILY MEDICINE

## 2020-03-24 NOTE — PROGRESS NOTES
"Britney Marquez is a 34 year old female who is being evaluated via a billable telephone visit.      The patient has been notified of following:     \"This telephone visit will be conducted via a call between you and your physician/provider. We have found that certain health care needs can be provided without the need for a physical exam.  This service lets us provide the care you need with a short phone conversation.  If a prescription is necessary we can send it directly to your pharmacy.  If lab work is needed we can place an order for that and you can then stop by our lab to have the test done at a later time.    If during the course of the call the physician/provider feels a telephone visit is not appropriate, you will not be charged for this service.\"     Britney Marquez complains of    Chief Complaint   Patient presents with     Abdominal Pain     after eating feels abdominal pain under ribs - 2 x a week over past months       I have reviewed and updated the patient's Past Medical History, Social History, Family History and Medication List.    ALLERGIES  Patient has no known allergies.    For the last few months she has episodes of sharp pain that radiates into her back.   Not immediately with eating. Eats after work, so usually several hours after., 1.5-2.  Not all the time. Can last hours when it happens. Pain is located epigastric region that radiates into spine.   Changed her diet, nothing spicy for example.   Does eat a lot of high fat foods    Assessment/Plan:  1. Abdominal pain with radiation to back  Probably gallbladder  - US Abdomen Limited; Future    She feels that she can control her symptoms for now with low fat diet until the COVID 19 virus settles down  Instructed     Phone call duration:  10 minutes    Ivory Hicks MD  "

## 2020-04-16 ENCOUNTER — MYC MEDICAL ADVICE (OUTPATIENT)
Dept: FAMILY MEDICINE | Facility: CLINIC | Age: 35
End: 2020-04-16

## 2020-04-16 DIAGNOSIS — B96.89 BV (BACTERIAL VAGINOSIS): ICD-10-CM

## 2020-04-16 DIAGNOSIS — N76.0 BV (BACTERIAL VAGINOSIS): ICD-10-CM

## 2020-04-17 RX ORDER — METRONIDAZOLE 500 MG/1
500 TABLET ORAL 2 TIMES DAILY
Qty: 14 TABLET | Refills: 0 | Status: SHIPPED | OUTPATIENT
Start: 2020-04-17 | End: 2020-06-26

## 2020-06-01 ENCOUNTER — HOSPITAL ENCOUNTER (OUTPATIENT)
Dept: ULTRASOUND IMAGING | Facility: CLINIC | Age: 35
Discharge: HOME | End: 2020-06-01
Attending: FAMILY MEDICINE | Admitting: FAMILY MEDICINE
Payer: COMMERCIAL

## 2020-06-01 DIAGNOSIS — R10.9 ABDOMINAL PAIN WITH RADIATION TO BACK: ICD-10-CM

## 2020-06-01 PROCEDURE — 76705 ECHO EXAM OF ABDOMEN: CPT

## 2020-06-26 ENCOUNTER — TELEPHONE (OUTPATIENT)
Dept: FAMILY MEDICINE | Facility: CLINIC | Age: 35
End: 2020-06-26

## 2020-06-26 ENCOUNTER — APPOINTMENT (OUTPATIENT)
Dept: GENERAL RADIOLOGY | Facility: CLINIC | Age: 35
End: 2020-06-26
Attending: NURSE PRACTITIONER
Payer: COMMERCIAL

## 2020-06-26 ENCOUNTER — HOSPITAL ENCOUNTER (EMERGENCY)
Facility: CLINIC | Age: 35
Discharge: HOME | End: 2020-06-26
Attending: NURSE PRACTITIONER | Admitting: NURSE PRACTITIONER
Payer: COMMERCIAL

## 2020-06-26 VITALS
RESPIRATION RATE: 16 BRPM | SYSTOLIC BLOOD PRESSURE: 111 MMHG | HEIGHT: 65 IN | DIASTOLIC BLOOD PRESSURE: 76 MMHG | BODY MASS INDEX: 21.66 KG/M2 | HEART RATE: 66 BPM | WEIGHT: 130 LBS | OXYGEN SATURATION: 100 % | TEMPERATURE: 98.5 F

## 2020-06-26 DIAGNOSIS — R06.02 SHORTNESS OF BREATH: ICD-10-CM

## 2020-06-26 LAB
ALBUMIN SERPL-MCNC: 4.4 G/DL (ref 3.4–5)
ALP SERPL-CCNC: 51 U/L (ref 40–150)
ALT SERPL W P-5'-P-CCNC: 23 U/L (ref 0–50)
ANION GAP SERPL CALCULATED.3IONS-SCNC: 5 MMOL/L (ref 3–14)
AST SERPL W P-5'-P-CCNC: 17 U/L (ref 0–45)
BASOPHILS # BLD AUTO: 0.1 10E9/L (ref 0–0.2)
BASOPHILS NFR BLD AUTO: 1.2 %
BILIRUB SERPL-MCNC: 0.8 MG/DL (ref 0.2–1.3)
BUN SERPL-MCNC: 9 MG/DL (ref 7–30)
CALCIUM SERPL-MCNC: 9.3 MG/DL (ref 8.5–10.1)
CHLORIDE SERPL-SCNC: 107 MMOL/L (ref 94–109)
CO2 SERPL-SCNC: 27 MMOL/L (ref 20–32)
CREAT SERPL-MCNC: 0.69 MG/DL (ref 0.52–1.04)
D DIMER PPP FEU-MCNC: 0.4 UG/ML FEU (ref 0–0.5)
DIFFERENTIAL METHOD BLD: NORMAL
EOSINOPHIL # BLD AUTO: 0.2 10E9/L (ref 0–0.7)
EOSINOPHIL NFR BLD AUTO: 2.1 %
ERYTHROCYTE [DISTWIDTH] IN BLOOD BY AUTOMATED COUNT: 13 % (ref 10–15)
GFR SERPL CREATININE-BSD FRML MDRD: >90 ML/MIN/{1.73_M2}
GLUCOSE SERPL-MCNC: 72 MG/DL (ref 70–99)
HCT VFR BLD AUTO: 40.2 % (ref 35–47)
HGB BLD-MCNC: 13.5 G/DL (ref 11.7–15.7)
IMM GRANULOCYTES # BLD: 0 10E9/L (ref 0–0.4)
IMM GRANULOCYTES NFR BLD: 0.2 %
LYMPHOCYTES # BLD AUTO: 2.2 10E9/L (ref 0.8–5.3)
LYMPHOCYTES NFR BLD AUTO: 27.2 %
MCH RBC QN AUTO: 30.8 PG (ref 26.5–33)
MCHC RBC AUTO-ENTMCNC: 33.6 G/DL (ref 31.5–36.5)
MCV RBC AUTO: 92 FL (ref 78–100)
MONOCYTES # BLD AUTO: 0.8 10E9/L (ref 0–1.3)
MONOCYTES NFR BLD AUTO: 9.7 %
NEUTROPHILS # BLD AUTO: 4.8 10E9/L (ref 1.6–8.3)
NEUTROPHILS NFR BLD AUTO: 59.6 %
NRBC # BLD AUTO: 0 10*3/UL
NRBC BLD AUTO-RTO: 0 /100
PLATELET # BLD AUTO: 274 10E9/L (ref 150–450)
POTASSIUM SERPL-SCNC: 3.6 MMOL/L (ref 3.4–5.3)
PROT SERPL-MCNC: 7.9 G/DL (ref 6.8–8.8)
RBC # BLD AUTO: 4.38 10E12/L (ref 3.8–5.2)
SODIUM SERPL-SCNC: 139 MMOL/L (ref 133–144)
TROPONIN I SERPL-MCNC: <0.015 UG/L (ref 0–0.04)
WBC # BLD AUTO: 8 10E9/L (ref 4–11)

## 2020-06-26 PROCEDURE — 85379 FIBRIN DEGRADATION QUANT: CPT | Performed by: NURSE PRACTITIONER

## 2020-06-26 PROCEDURE — 99285 EMERGENCY DEPT VISIT HI MDM: CPT | Mod: 25 | Performed by: NURSE PRACTITIONER

## 2020-06-26 PROCEDURE — 85025 COMPLETE CBC W/AUTO DIFF WBC: CPT | Performed by: NURSE PRACTITIONER

## 2020-06-26 PROCEDURE — 84484 ASSAY OF TROPONIN QUANT: CPT | Performed by: NURSE PRACTITIONER

## 2020-06-26 PROCEDURE — 93005 ELECTROCARDIOGRAM TRACING: CPT | Performed by: NURSE PRACTITIONER

## 2020-06-26 PROCEDURE — 36415 COLL VENOUS BLD VENIPUNCTURE: CPT | Performed by: NURSE PRACTITIONER

## 2020-06-26 PROCEDURE — 80053 COMPREHEN METABOLIC PANEL: CPT | Performed by: NURSE PRACTITIONER

## 2020-06-26 PROCEDURE — 93010 ELECTROCARDIOGRAM REPORT: CPT | Mod: Z6 | Performed by: NURSE PRACTITIONER

## 2020-06-26 PROCEDURE — 99285 EMERGENCY DEPT VISIT HI MDM: CPT | Performed by: NURSE PRACTITIONER

## 2020-06-26 PROCEDURE — 71045 X-RAY EXAM CHEST 1 VIEW: CPT

## 2020-06-26 ASSESSMENT — MIFFLIN-ST. JEOR: SCORE: 1280.56

## 2020-06-26 NOTE — ED AVS SNAPSHOT
[facility] Emergency Department  [address] [facility] [address] [place] [address]  Phone:  [phone]  Fax:  [phone]                                    Britney Marquez   MRN: [id number]    Department:  [facility] Emergency Department   Date of Visit:  6/26/2020           After Visit Summary Signature Page    I have received my discharge instructions, and my questions have been answered. I have discussed any challenges I see with this plan with the nurse or doctor.    ..........................................................................................................................................  Patient/Patient Representative Signature      ..........................................................................................................................................  Patient Representative Print Name and Relationship to Patient    ..................................................               ................................................  Date                                   Time    ..........................................................................................................................................  Reviewed by Signature/Title    ...................................................              ..............................................  Date                                               Time          22EPIC Rev 08/18

## 2020-06-26 NOTE — TELEPHONE ENCOUNTER
Reason for Call:  Other    Detailed comments: breathing issues for last month states each day getting worse she states not sure if URI or issues because she has to wear a mask daily  no appointments left in [place] did tell her about  4-8     Phone Number Patient can be reached at: Home number on file [phone] (home)    Best Time: anytime    Can we leave a detailed message on this number? Not Applicable    Call taken on 6/26/2020 at 2:47 PM by Rhianna Beaver

## 2020-06-26 NOTE — ED NOTES
Refused IV, labs drawn with butterfly, aware if D-dimer is elevated will need IV placed, informed will await lab result before sending to xray due to if elevated will need CT

## 2020-06-26 NOTE — DISCHARGE INSTRUCTIONS
Consider stress coping mechanisms with the current pandemic.  Biofeedback, self hypnosis, exercise, counseling, and possibly medication if need be.  Consider Holter monitor to evaluate for irregular heartbeat.  Return to the emergency room if you develop fever, aches, chills, sweats.  A COVID test was offered and declined today.  Thank you for coming in today.

## 2020-06-26 NOTE — TELEPHONE ENCOUNTER
S-(situation): breathing concerns    B-(background): past month and half    A-(assessment): patient was called and is reporting it is difficult to take in deep breath, is getting worse over the past month and a half, reports a sharp pain on the back left hand side, denies any injury. Patient says it hurts to breath, denies dizzy or lightheaded, no chest pain, no headache, no fever. Is not pale or blue tinged, not weak.    R-(recommendations): Advised ER at [place] immediately to rule out causes for emergent symptoms. Patient agrees with the plan.    FERNANDA Watts

## 2020-06-26 NOTE — ED PROVIDER NOTES
History     Chief Complaint   Patient presents with     Shortness of Breath     HPI  Britney Marquez is a 34 year old female who presents to the ED with 6 week history of shortness of breath.  Pt states she always wears a mask when she goes outside for the past 6-8 weeks.  Pt states it has become progressively tougher to breathe with a mask on.  Pt states when she has a mask on she feels heaviness in the mid sternal chest region and sharp pain just below her left scapula in her back.   Pt states there is always a constant dull pain but the pain becomes sharp when taking a deep breath or wearing a mask.  Pt reports her symptoms have become worse over the past week or so and they are present constantly and not just when wearing the mask.    Pt denies any medication changes.  Pt states taking daily claritin.    PT states she tried a chiropractic a few times but did not find any improvement.  Pt has not tried other therapies.    Patient denies fever, aches, chills, sweats, ear pain, eye pain, throat pain, cough, wheezing, abdominal pain, nausea, vomiting, diarrhea, dysuria, speech difficulty, mental confusion, thoughts of harming self.    Patient has no history of leading or clotting disorders, recent immobilization, recent surgeries, smoking history, recent or current hormone usage.    (PT reports mom has cancer and she is taking care of her and due to mom's health, she has been wearing a mask for a long time.)    Allergies:  No Known Allergies    Problem List:    Patient Active Problem List    Diagnosis Date Noted     Overactive bladder 08/28/2018     Priority: Medium     Frequency-urgency syndrome 08/09/2018     Priority: Medium     PFD (pelvic floor dysfunction) 12/11/2017     Priority: Medium     S/P LEEP of cervix 11/02/2013     Priority: Medium     5/2002:ECC revealing focal ALLEY-3       Suicide attempt (H) 03/22/2011     Priority: Medium     CARDIOVASCULAR SCREENING; LDL GOAL LESS THAN 160 10/31/2010      "Priority: Medium     Mild recurrent major depression (H) 10/24/2010     Priority: Medium     GERD (gastroesophageal reflux disease) 2010     Priority: Medium        Past Medical History:    Past Medical History:   Diagnosis Date     Mental disorders of mother, postpartum      Papanicolaou smear of cervix with atypical squamous cells of undetermined significance (ASC-US) 2001     S/P LEEP of cervix 2002       Past Surgical History:    Past Surgical History:   Procedure Laterality Date     C  DELIVERY ONLY  3/2/2003    Low transverse  section -[facility]-     LAPAROSCOPIC TUBAL LIGATION Bilateral 2018    Procedure: LAPAROSCOPIC TUBAL LIGATION;  Laparoscopic bilateral tubal ligation;  Surgeon: Kina Lujan MD;  Location: [facility]     SURGICAL HISTORY OF -       LEEP ECC Showed HPV effect     SURGICAL HISTORY OF -       EEC ALLEY-III  pregnacy repeat pap       Family History:    Family History   Problem Relation Age of Onset     Allergies Mother      Respiratory Mother         asthma     Hypertension Father      Cancer Maternal Grandfather         skin cancer       Social History:  Marital Status:  Single [1]  Social History     Tobacco Use     Smoking status: Current Every Day Smoker     Packs/day: 0.25     Years: 17.00     Pack years: 4.25     Types: Cigarettes     Smokeless tobacco: Never Used   Substance Use Topics     Alcohol use: Yes     Drug use: No        Medications:    loratadine (CLARITIN) 10 MG tablet        Review of Systems  As mentioned above in the history present illness. All other systems were reviewed and are negative.    Physical Exam   BP: 109/71  Pulse: 70  Temp: 98.5  F (36.9  C)  Resp: 16  Height: 165.1 cm (5' 5\")  Weight: 59 kg (130 lb)  SpO2: 100 %      Physical Exam  Vitals signs and nursing note reviewed.   Constitutional:       General: She is not in acute distress.     Appearance: She is well-developed and normal weight. She is not ill-appearing, " toxic-appearing or diaphoretic.   HENT:      Head: Normocephalic and atraumatic.      Right Ear: External ear normal.      Left Ear: External ear normal.      Mouth/Throat:      Mouth: Mucous membranes are moist.      Pharynx: No pharyngeal swelling or oropharyngeal exudate.   Eyes:      General:         Right eye: No discharge.         Left eye: No discharge.      Extraocular Movements: Extraocular movements intact.      Conjunctiva/sclera: Conjunctivae normal.      Pupils: Pupils are equal, round, and reactive to light.   Neck:      Musculoskeletal: Normal range of motion and neck supple.      Thyroid: No thyromegaly.   Cardiovascular:      Rate and Rhythm: Normal rate and regular rhythm.      Pulses: No decreased pulses.      Heart sounds: Normal heart sounds. No murmur. No friction rub. No gallop.    Pulmonary:      Effort: Pulmonary effort is normal. No tachypnea, bradypnea, accessory muscle usage or respiratory distress.      Breath sounds: Normal breath sounds. No stridor. No decreased breath sounds, wheezing, rhonchi or rales.   Chest:      Chest wall: No tenderness.   Lymphadenopathy:      Cervical: No cervical adenopathy.   Skin:     General: Skin is warm and dry.      Capillary Refill: Capillary refill takes less than 2 seconds.      Coloration: Skin is not pale.      Findings: No erythema or rash.   Neurological:      General: No focal deficit present.      Mental Status: She is alert.      Cranial Nerves: No cranial nerve deficit.   Psychiatric:         Mood and Affect: Mood is anxious.         Behavior: Behavior is not agitated.         ED Course        Procedures         EKG Interpretation:      EKG Number: 1  Interpreted by ABBY Pérez CNP, Teofilo Lane MD  Symptoms at time of EKG: shortness of breath   Rhythm: Normal sinus  and Sinus bradycardia  Rate: 57  Axis: Normal  Ectopy: None  Conduction: Normal  ST Segments/ T Waves: No acute ischemic changes and T wave inversion III and aVF  Q  Waves: None  Comparison to prior: No old EKG available    Clinical Impression: no acute changes and non-specific EKG      No results found for this or any previous visit (from the past 24 hour(s)).    Medications - No data to display    Assessments & Plan (with Medical Decision Making)  Britney Marquez is a 34 year old female who presents to the ED with 6 week history of shortness of breath.  Patient has no known coronavirus exposure and without signs of sepsis.  Vital signs are stable.  Patient has no history of PE or bleeding or clotting disorder.  CBC ordered and reveals no acute infection or blood loss.  D-dimer obtained and is negative.  EKG does not reveal any concern for acute coronary syndrome or STEMI with a negative/normal troponin.  Chest x-ray obtained and reveals no acute cardiopulmonary disease.  Reviewed all these findings with patient.  Offered COVID testing and patient declines this.  Discussed potential etiology of anxiety versus GERD type symptoms.  Encourage follow-up with primary for ongoing care.  Patient verbalized understanding   Patient discharged in stable condition.       I have reviewed the nursing notes.    I have reviewed the findings, diagnosis, plan and need for follow up with the patient.    Discharge Medication List as of 6/26/2020  6:44 PM          Final diagnoses:   Shortness of breath       6/26/2020   [facility] EMERGENCY DEPARTMENT     Evelin Blake, ABBY CNP  06/27/20 [id number]

## 2020-08-24 ENCOUNTER — MYC REFILL (OUTPATIENT)
Dept: FAMILY MEDICINE | Facility: CLINIC | Age: 35
End: 2020-08-24

## 2020-08-24 DIAGNOSIS — J30.2 SEASONAL ALLERGIC RHINITIS, UNSPECIFIED TRIGGER: ICD-10-CM

## 2020-08-25 RX ORDER — LORATADINE 10 MG/1
1 TABLET ORAL DAILY
Qty: 90 TABLET | Refills: 1 | Status: SHIPPED | OUTPATIENT
Start: 2020-08-25

## 2020-11-16 ENCOUNTER — HEALTH MAINTENANCE LETTER (OUTPATIENT)
Age: 35
End: 2020-11-16

## 2021-09-18 ENCOUNTER — HEALTH MAINTENANCE LETTER (OUTPATIENT)
Age: 36
End: 2021-09-18

## 2022-01-08 ENCOUNTER — HEALTH MAINTENANCE LETTER (OUTPATIENT)
Age: 37
End: 2022-01-08

## 2022-11-19 ENCOUNTER — HEALTH MAINTENANCE LETTER (OUTPATIENT)
Age: 37
End: 2022-11-19

## 2023-04-09 ENCOUNTER — HEALTH MAINTENANCE LETTER (OUTPATIENT)
Age: 38
End: 2023-04-09

## 2024-04-07 ENCOUNTER — HEALTH MAINTENANCE LETTER (OUTPATIENT)
Age: 39
End: 2024-04-07

## 2024-06-16 ENCOUNTER — HEALTH MAINTENANCE LETTER (OUTPATIENT)
Age: 39
End: 2024-06-16

## (undated) DEVICE — ANTIFOG SOLUTION W/FOAM PAD 31142527

## (undated) DEVICE — PACK LAPAROSCOPY/PELVISCOPY STD

## (undated) DEVICE — GLOVE PROTEXIS BLUE W/NEU-THERA 7.0  2D73EB70

## (undated) DEVICE — CATH INTERMITTENT CLEAN-CATH FEMALE 14FR 6" VINYL LF 420614

## (undated) DEVICE — ENDO TROCAR 05/8MM VERSAPORT 179074P

## (undated) DEVICE — GLOVE PROTEXIS W/NEU-THERA 7.0  2D73TE70

## (undated) DEVICE — SU MONOCRYL 4-0 PS-2 18" UND Y496G

## (undated) DEVICE — DECANTER VIAL 2006S

## (undated) DEVICE — ENDO TROCAR OPTICAL 05MM VERSAPORT PLUS W/FIX CAN ONB5STF

## (undated) DEVICE — STOCKING SLEEVE COMPRESSION CALF LG

## (undated) DEVICE — TUBING C02 INSUFFLATION LAP FILTER HEATER 6198

## (undated) DEVICE — SOL NACL 0.9% IRRIG 1000ML BOTTLE 07138-09

## (undated) DEVICE — ADHESIVE SWIFTSET 0.8ML OCTYL SS6

## (undated) DEVICE — BLADE KNIFE SURG 11 371111

## (undated) DEVICE — ESU HOLDER LAP INST DISP PURPLE LONG 330MM H-PRO-330

## (undated) DEVICE — ENDO CANNULA 05MM VERSAONE UNIVERSAL UNVCA5STF

## (undated) DEVICE — PAD PERI INDIV WRAP 11" 2022

## (undated) DEVICE — GOWN XLG DISP 9545

## (undated) RX ORDER — PROPOFOL 10 MG/ML
INJECTION, EMULSION INTRAVENOUS
Status: DISPENSED
Start: 2018-04-02

## (undated) RX ORDER — FENTANYL CITRATE 50 UG/ML
INJECTION, SOLUTION INTRAMUSCULAR; INTRAVENOUS
Status: DISPENSED
Start: 2018-04-02

## (undated) RX ORDER — GLYCOPYRROLATE 0.2 MG/ML
INJECTION, SOLUTION INTRAMUSCULAR; INTRAVENOUS
Status: DISPENSED
Start: 2018-04-02

## (undated) RX ORDER — DEXAMETHASONE SODIUM PHOSPHATE 4 MG/ML
INJECTION, SOLUTION INTRA-ARTICULAR; INTRALESIONAL; INTRAMUSCULAR; INTRAVENOUS; SOFT TISSUE
Status: DISPENSED
Start: 2018-04-02

## (undated) RX ORDER — BUPIVACAINE HYDROCHLORIDE 2.5 MG/ML
INJECTION, SOLUTION INFILTRATION; PERINEURAL
Status: DISPENSED
Start: 2018-04-02

## (undated) RX ORDER — NEOSTIGMINE METHYLSULFATE 1 MG/ML
VIAL (ML) INJECTION
Status: DISPENSED
Start: 2018-04-02

## (undated) RX ORDER — OXYCODONE HYDROCHLORIDE 5 MG/1
TABLET ORAL
Status: DISPENSED
Start: 2018-04-02

## (undated) RX ORDER — ONDANSETRON 2 MG/ML
INJECTION INTRAMUSCULAR; INTRAVENOUS
Status: DISPENSED
Start: 2018-04-02

## (undated) RX ORDER — LIDOCAINE HYDROCHLORIDE 10 MG/ML
INJECTION, SOLUTION EPIDURAL; INFILTRATION; INTRACAUDAL; PERINEURAL
Status: DISPENSED
Start: 2018-04-02